# Patient Record
Sex: MALE | Race: WHITE | Employment: FULL TIME | URBAN - METROPOLITAN AREA
[De-identification: names, ages, dates, MRNs, and addresses within clinical notes are randomized per-mention and may not be internally consistent; named-entity substitution may affect disease eponyms.]

---

## 2022-02-25 ENCOUNTER — HOSPITAL ENCOUNTER (OUTPATIENT)
Dept: PREADMISSION TESTING | Age: 67
Discharge: HOME | End: 2022-02-25

## 2022-02-25 ENCOUNTER — HOSPITAL ENCOUNTER (OUTPATIENT)
Dept: GENERAL RADIOLOGY | Age: 67
Discharge: HOME | End: 2022-02-25
Attending: UROLOGY
Payer: COMMERCIAL

## 2022-02-25 VITALS
SYSTOLIC BLOOD PRESSURE: 112 MMHG | DIASTOLIC BLOOD PRESSURE: 73 MMHG | TEMPERATURE: 97.9 F | HEIGHT: 73 IN | BODY MASS INDEX: 33.57 KG/M2 | RESPIRATION RATE: 16 BRPM | HEART RATE: 59 BPM | WEIGHT: 253.31 LBS

## 2022-02-25 LAB
APPEARANCE UR: CLEAR
BACTERIA URNS QL MICRO: NEGATIVE /HPF
BASOPHILS # BLD: 0.1 K/UL (ref 0–0.1)
BASOPHILS NFR BLD: 1 % (ref 0–1)
BILIRUB UR QL: NEGATIVE
COLOR UR: ABNORMAL
DIFFERENTIAL METHOD BLD: ABNORMAL
EOSINOPHIL # BLD: 0.6 K/UL (ref 0–0.4)
EOSINOPHIL NFR BLD: 6 % (ref 0–7)
EPITH CASTS URNS QL MICRO: ABNORMAL /LPF
ERYTHROCYTE [DISTWIDTH] IN BLOOD BY AUTOMATED COUNT: 15.1 % (ref 11.5–14.5)
GLUCOSE UR STRIP.AUTO-MCNC: >1000 MG/DL
HCT VFR BLD AUTO: 46.3 % (ref 36.6–50.3)
HGB BLD-MCNC: 15.1 G/DL (ref 12.1–17)
HGB UR QL STRIP: NEGATIVE
HYALINE CASTS URNS QL MICRO: ABNORMAL /LPF (ref 0–5)
IMM GRANULOCYTES # BLD AUTO: 0.1 K/UL (ref 0–0.04)
IMM GRANULOCYTES NFR BLD AUTO: 1 % (ref 0–0.5)
KETONES UR QL STRIP.AUTO: NEGATIVE MG/DL
LEUKOCYTE ESTERASE UR QL STRIP.AUTO: NEGATIVE
LYMPHOCYTES # BLD: 2.1 K/UL (ref 0.8–3.5)
LYMPHOCYTES NFR BLD: 23 % (ref 12–49)
MCH RBC QN AUTO: 28.8 PG (ref 26–34)
MCHC RBC AUTO-ENTMCNC: 32.6 G/DL (ref 30–36.5)
MCV RBC AUTO: 88.2 FL (ref 80–99)
MONOCYTES # BLD: 0.9 K/UL (ref 0–1)
MONOCYTES NFR BLD: 9 % (ref 5–13)
NEUTS SEG # BLD: 5.6 K/UL (ref 1.8–8)
NEUTS SEG NFR BLD: 60 % (ref 32–75)
NITRITE UR QL STRIP.AUTO: NEGATIVE
NRBC # BLD: 0 K/UL (ref 0–0.01)
NRBC BLD-RTO: 0 PER 100 WBC
PH UR STRIP: 5.5 [PH] (ref 5–8)
PLATELET # BLD AUTO: 237 K/UL (ref 150–400)
PMV BLD AUTO: 10.4 FL (ref 8.9–12.9)
PROT UR STRIP-MCNC: NEGATIVE MG/DL
RBC # BLD AUTO: 5.25 M/UL (ref 4.1–5.7)
RBC #/AREA URNS HPF: ABNORMAL /HPF (ref 0–5)
SP GR UR REFRACTOMETRY: 1.03 (ref 1–1.03)
UA: UC IF INDICATED,UAUC: ABNORMAL
UROBILINOGEN UR QL STRIP.AUTO: 0.2 EU/DL (ref 0.2–1)
WBC # BLD AUTO: 9.3 K/UL (ref 4.1–11.1)
WBC URNS QL MICRO: ABNORMAL /HPF (ref 0–4)

## 2022-02-25 PROCEDURE — 85025 COMPLETE CBC W/AUTO DIFF WBC: CPT

## 2022-02-25 PROCEDURE — 71046 X-RAY EXAM CHEST 2 VIEWS: CPT

## 2022-02-25 PROCEDURE — 81001 URINALYSIS AUTO W/SCOPE: CPT

## 2022-02-25 RX ORDER — METFORMIN HYDROCHLORIDE 750 MG/1
750 TABLET, EXTENDED RELEASE ORAL 2 TIMES DAILY
COMMUNITY

## 2022-02-25 RX ORDER — ASCORBIC ACID 500 MG
1000 TABLET ORAL DAILY
COMMUNITY

## 2022-02-25 RX ORDER — PYRIDOXINE HCL (VITAMIN B6) 100 MG
100 TABLET ORAL
Status: ON HOLD | COMMUNITY
End: 2022-09-09

## 2022-02-25 RX ORDER — SERTRALINE HYDROCHLORIDE 50 MG/1
50 TABLET, FILM COATED ORAL DAILY
COMMUNITY
End: 2022-03-15

## 2022-02-25 RX ORDER — LANOLIN ALCOHOL/MO/W.PET/CERES
2000 CREAM (GRAM) TOPICAL
Status: ON HOLD | COMMUNITY
End: 2022-09-09

## 2022-02-25 RX ORDER — BISMUTH SUBSALICYLATE 262 MG
1 TABLET,CHEWABLE ORAL DAILY
COMMUNITY

## 2022-02-25 RX ORDER — DULAGLUTIDE 0.75 MG/.5ML
0.75 INJECTION, SOLUTION SUBCUTANEOUS
COMMUNITY

## 2022-02-25 RX ORDER — TAMSULOSIN HYDROCHLORIDE 0.4 MG/1
0.8 CAPSULE ORAL DAILY
Status: ON HOLD | COMMUNITY
End: 2022-03-11

## 2022-02-25 RX ORDER — SIMVASTATIN 40 MG/1
40 TABLET, FILM COATED ORAL
COMMUNITY

## 2022-02-25 RX ORDER — TEMAZEPAM 15 MG/1
15 CAPSULE ORAL
COMMUNITY

## 2022-02-25 RX ORDER — TRAZODONE HYDROCHLORIDE 50 MG/1
50 TABLET ORAL 2 TIMES DAILY
COMMUNITY

## 2022-02-25 RX ORDER — SOTALOL HYDROCHLORIDE 120 MG/1
120 TABLET ORAL 2 TIMES DAILY
Status: ON HOLD | COMMUNITY
End: 2022-09-09

## 2022-02-25 RX ORDER — ALLOPURINOL 300 MG/1
300 TABLET ORAL
COMMUNITY

## 2022-02-25 NOTE — PERIOP NOTES
[facility] INSTRUCTIONS    Surgery Date:   3/11/22    [facility] staff will call you between 4 PM- 8 PM the day before surgery with your arrival time. If your surgery is on a Monday, we will call you the preceding Friday. Please call [phone] after 8 PM if you did not receive your arrival time. 1. Please report to [facility] Patient Access/Admitting on the 1st floor. Bring your insurance card, photo identification, and any copayment ( if applicable). 2. If you are going home the same day of your surgery, you must have a responsible adult to drive you home. You need to have a responsible adult to stay with you the first 24 hours after surgery and you should not drive a car for 24 hours following your surgery. 3. Nothing to eat or drink after midnight the night before surgery. This includes no water, gum, mints, coffee, juice, etc.  Please note special instructions, if applicable, below for medications. 4. Do NOT drink alcohol or smoke 24 hours before surgery. STOP smoking for 14 days prior as it helps with breathing and healing after surgery. 5. If you are being admitted to the hospital, please leave personal belongings/luggage in your car until you have an assigned hospital room number. 6. Please wear comfortable clothes. Wear your glasses instead of contacts. We ask that all money, jewelry and valuables be left at home. Wear no make up, particularly mascara, the day of surgery. 7.  All body piercings, rings, and jewelry need to be removed and left at home. Please remove any nail polish or artificial nails from your fingernails. Please wear your hair loose or down. Please no pony-tails, buns, or any metal hair accessories. If you shower the morning of surgery, please do not apply any lotions or powders afterwards. You may wear deodorant, unless having breast surgery. Do not shave any body area within 24 hours of your surgery.   8. Please follow all instructions to avoid any potential surgical cancellation. 9. Should your physical condition change, (i.e. fever, cold, flu, etc.) please notify your surgeon as soon as possible. 10. It is important to be on time. If a situation occurs where you may be delayed, please call:  [phone] / [phone] on the day of surgery. 11. The Preadmission Testing staff can be reached at [phone]. 12. Special instructions: BRING CPAP AND ADVANCED DIRECTIVE      Current Outpatient Medications   Medication Sig    dulaglutide (Trulicity) 7.89 AJ/8.1 mL sub-q pen 0.75 mg by SubCUTAneous route every seven (7) days. PT TAKES ON SUNDAY    metFORMIN ER (GLUCOPHAGE XR) 750 mg tablet Take 750 mg by mouth two (2) times a day.  sotaloL (BETAPACE) 120 mg tablet Take 120 mg by mouth two (2) times a day.  tamsulosin (FLOMAX) 0.4 mg capsule Take 0.8 mg by mouth daily.  allopurinoL (ZYLOPRIM) 300 mg tablet Take 300 mg by mouth nightly.  simvastatin (ZOCOR) 40 mg tablet Take 40 mg by mouth nightly.  apixaban (Eliquis) 5 mg tablet Take 5 mg by mouth two (2) times a day.  sertraline (ZOLOFT) 50 mg tablet Take 50 mg by mouth daily.  empagliflozin (Jardiance) 25 mg tablet Take 25 mg by mouth Every morning.  traZODone (DESYREL) 100 mg tablet Take 200 mg by mouth nightly.  temazepam (RESTORIL) 15 mg capsule Take 15 mg by mouth nightly.  ascorbic acid, vitamin C, (Vitamin C) 500 mg tablet Take 1,000 mg by mouth daily.  multivitamin (ONE A DAY) tablet Take 1 Tablet by mouth daily.  pyridoxine, vitamin B6, (Vitamin B-6) 100 mg tablet Take 100 mg by mouth. Monday AND FRIDAY    cyanocobalamin (Vitamin B-12) 1,000 mcg tablet Take 2,000 mcg by mouth. Monday AND FRIDAY     No current facility-administered medications for this encounter. 1. YOU MUST ONLY TAKE THESE MEDICATIONS THE MORNING OF SURGERY WITH A SIP OF WATER: SOTALOL, SERTRALINE  2. MEDICATIONS TO TAKE THE MORNING OF SURGERY ONLY IF NEEDED: N/A  3.  HOLD these prescription medications BEFORE Surgery: METFORMIN 24 HRS PRIOR TO SURGERY  4. Ask your surgeon/prescribing physician about when/if to STOP taking these medications: ELIQUIS  5. Stop all vitamins, herbal medicines and Aspirin containing products 7 days prior to surgery. Stop any non-steroidal anti-inflammatory drugs (i.e. Ibuprofen, Naproxen, Advil, Aleve) 3 days before surgery. You may take Tylenol. 6. If you are currently taking Plavix, Coumadin, or any other blood-thinning/anticoagulant medication contact your prescribing physician for instructions. Preventing Infections Before and After - Your Surgery    IMPORTANT INSTRUCTIONS      You play an important role in your health and preparation for surgery. To reduce the germs on your skin you will need to shower with CHG soap (Chorhexidine gluconate 4%) two times before surgery. CHG soap (Hibiclens, Hex-A-Clens or store brand)   CHG soap will be provided at your Preadmission Testing (PAT) appointment.  If you do not have a PAT appointment before surgery, you may arrange to  CHG soap from our office or purchase CHG soap at a pharmacy, grocery or department store.  You need to purchase TWO 4 ounce bottles to use for your 2 showers. Steps to follow:  1. Wash your hair with your normal shampoo and your body with regular soap and rinse well to remove shampoo and soap from your skin. 2. Wet a clean washcloth and turn off the shower. 3. Put CHG soap on washcloth and apply to your entire body from the neck down. Do not use on your head, face or private parts(genitals). Do not use CHG soap on open sores, wounds or areas of skin irritation. 4. Wash you body gently for 5 minutes. Do not wash your skin too hard. This soap does not create lather. Pay special attention to your underarms and from your belly button to your feet. 5. Turn the shower back on and rinse well to get CHG soap off your body. 6. Pat your skin dry with a clean, dry towel.  Do not apply lotions or moisturizer. 7. Put on clean clothes and sleep on fresh bed sheets and do not allow pets to sleep with you. Shower with CHG soap 2 times before your surgery   The evening before your surgery   The morning of your surgery      Tips to help prevent infections after your surgery:  1. Protect your surgical wound from germs:  ? Hand washing is the most important thing you and your caregivers can do to prevent infections. ? Keep your bandage clean and dry! ? Do not touch your surgical wound. 2. Use clean, freshly washed towels and washcloths every time you shower; do not share bath linens with others. 3. Until your surgical wound is healed, wear clothing and sleep on bed linens each day that are clean and freshly washed. 4. Do not allow pets to sleep in your bed with you or touch your surgical wound. 5. Do not smoke - smoking delays wound healing. This may be a good time to stop smoking. 6. If you have diabetes, it is important for you to manage your blood sugar levels properly before your surgery as well as after your surgery. Poorly managed blood sugar levels slow down wound healing and prevent you from healing completely. [facility]   Instructions for Pre-Surgery COVID-19 Testing 3/8/22 AT 10AM    Across our ministry we have established standard guidelines to ensure the health and safety of our patients, residents and associates as we resume elective services for patients. All patients presenting for surgery are required to have a COVID-19 test result within 96 hours of their scheduled surgery. [facility] is providing this test free of charge to the patient.    Instructions for COVID-19 Testing:     Patients will receive a call from Pre-Admission Testing 4-5 days prior to surgery to schedule a date and time to come to the [address] [place] [address] Drive for their COVID-19 test   Patients are advised to self-quarantine after testing until their scheduled surgery   Once on site, patients will be registered and receive COVID test in their vehicle   If a patient is scheduled for normal Pre Admission Testing 96 hours from date of surgery, the patient will still have their COVID test done at the [address] [place] [address] located at [address] [place] [address] Positive results will be shared with the surgeon and anesthesiologist and may result in cancellation of the elective procedure    Testing Hours and Location:   Address:  [address] [place] [address] Rd Admission [facility] in the Discharge Lot on [facility] (Map Attached)  [facility], [address]   Hours: Monday- Friday 7a-3p    PAT Phone Number: [phone]            Patient Information Regarding COVID Restrictions    Patients are advised to self-quarantine after COVID testing up to the day of the scheduled procedure. Day of Procedure     Please park in the parking deck or any designated visitor parking lot.  Enter the facility through the Main Entrance of the hospital.   A temperature check and appropriate symptom/exposure screening will be done prior to entry to the facility.  On the day of surgery, please provide the cell phone number of the person who will be waiting for you to the Patient Access representative at the time of registration.  Please wear a mask on the day of your procedure.  We are now allowing one designated visitor per stay. Pediatric patients may have 2 designated visitors. This one person may come in with you on the day of your procedure.  No visitors under the age of 13.  The designated visitor must also wear a mask.  Once your procedure and the immediate recovery period is completed, a nurse in the recovery area will contact your designated visitor to inform them of your room number or to otherwise review other pertinent information regarding your care.      Social distancing practices are to be adhered to in waiting areas and the cafeteria. The patient was contacted  in person. He verbalized understanding of all instructions does not  need reinforcement.

## 2022-02-25 NOTE — PERIOP NOTES
Copy of COVID Vaccine Card place on chart. CARDIAC NOTES, EKG AND CARDIAC CLEARANCE ON CHART. PULMONARY NOTES ON CHART.     CMP and HGBA1C received from PCP and placed on chart

## 2022-03-08 ENCOUNTER — HOSPITAL ENCOUNTER (OUTPATIENT)
Dept: PREADMISSION TESTING | Age: 67
Discharge: HOME | End: 2022-03-08
Attending: UROLOGY
Payer: COMMERCIAL

## 2022-03-08 ENCOUNTER — TRANSCRIBE ORDER (OUTPATIENT)
Dept: REGISTRATION | Age: 67
End: 2022-03-08

## 2022-03-08 DIAGNOSIS — U07.1 COVID-19: Primary | ICD-10-CM

## 2022-03-08 DIAGNOSIS — U07.1 COVID-19: ICD-10-CM

## 2022-03-08 PROCEDURE — U0005 INFEC AGEN DETEC AMPLI PROBE: HCPCS

## 2022-03-09 LAB
SARS-COV-2, XPLCVT: NOT DETECTED
SOURCE, COVRS: NORMAL

## 2022-03-09 NOTE — H&P
Madhavi Morfin is a 77year old male who presents today for \"BPH f/u\". He returns for follow-up. Chronic issues for LUTS of frequency, urgency, weak stream and incomplete bladder emptying secondary to BPH with obstruction and diabetes. He underwent evaluation for UroLift and potentially TURP. Prostate volume was 90 cm³. Images show an obstructing median lobe. Cystoscopy in June 2021 showed lateral lobe obstruction to the prostate with the lateral lobes coapting in the midline and a large median lobe not intravesical without a sulcus. The anatomy was not really conducive to UroLift and we discussed TURP. He elected for trial of tamsulosin and finasteride. He takes Eliquis because of his atrial fibrillation. On 1/10/2022 he had a follow-up transit abdominal ultrasound and lab work. PSA was 0.705 testosterone normal at 329.6. Prostate volume has decreased to approximately 50 cm³. He is still not emptying to completion with prevoid bladder volume of 298 mL and postvoid bladder volume of 254 mL. Ultrasound personally reviewed and does show persistence of obstructing median lobe. Wife comes with him to the visit today. He stated he has not gotten any significant improvement on combination tamsulosin and finasteride. With his wife not in the room he stated his ED has gotten much worse. He is unsure of the dose of Viagra that he takes but he is now taking a full tablet. He says he used to take 1/2 tablet. He says sometimes he is able to obtain an erection suitable for penetration other times not able to do so. Reviewed his ultrasound and postvoid residual results today. He says he just feels like he is not emptying at all. He would like to go forward with cystoscopy and TURP. Reviewed the procedure with him as well as potential risks and complications. He does realize he is at a higher tisk of complications because of his anticoagulation and afib.     PAST MEDICAL HISTORY:    Allergies: PENICILLIN (PENICILLIN V POTASSIUM) (Critical)  ERYTHROMYCIN (ERYTHROMYCIN BASE) (Critical)  DENIES: Latex, Shellfish, X-Ray Dye, Iodine. Medications: temazepam 15 mg capsule (temazepam)   trazodone 50 mg tablet (trazodone)   allopurinol 300 mg tablet (allopurinol)   tamsulosin 0.4 mg capsule (tamsulosin)   Eliquis 5 mg tablet (apixaban)   sertraline 100 mg tablet (sertraline)   Jardiance 25 mg tablet (empagliflozin)   Trulicity 0.82 WN/7.3 mL pen injector (dulaglutide)   simvastatin 40 mg tablet (simvastatin)   sotalol 80 mg tablet (sotalol)   finasteride 5 mg tablet (finasteride) TAKE 1 TABLET BY MOUTH  DAILY    Problems: Anticoagulation therapy (ICD-V58.61) (RSM25-Q40.01)  Chronic atrial fibrillation, unspecified (YNN63-B86.20)  Diabetes mellitus 250.00 (ICD-250.00) (ASY06-W50.9)  Urinary frequency (ICD-788.41) (FRZ30-X58.0)  Incomplete bladder emptying (ICD-788.20) (GRY96-K37.9)  BPH with urinary obstruction (ICD-600.01) (JTM82-X38.1)  Urinary obstruction, unspecified (ICD-599.60) (AMK00-X20.9)    Illnesses: Heart Disease, Diabetes, and High Blood Pressure. DENIES: Pacemaker/Defibrillator, Lung Disease, Bowel Problems, Stroke/Seizure, Kidney Problems, Bleeding Problems, HIV, Hepatitis, or Cancer. Surgeries: Joint Replacement Surgery and Hernia Repair. Family History: Kidney Disease. DENIES: Prostate cancer, Kidney cancer, Kidney stones. Social History: Retired. . Smoking status: never smoker. Does not drink alcohol. System Review: Admits to: Impaired Sex Drive and Involuntary Urine Loss. DENIES: Unexplained Weight Loss, Dry Eyes, Leg Swelling, Shortness of Breath, Constipation, Lower Extremity Weakness, Dry Skin, Difficulty Walking, Psychiatric Problems, Easy Bleeding.      EXAMINATION: Vitals: Pulse: 65 BP: 119/68 Weight: 253 lbs Height: 6' 2\" BMI: 32.60 kg/m^2  Appearance: well-developed NAD Neck: supple Respiratory Effort: breathing easily  Hemoccult: not indicated Skin Inspection: warm and dry Orientation: oriented to person; time and place Mood/Affect: normal       URINALYSIS  Urine Micro not done    PSA HISTORY  0.705 ng/ml on 01/10/2022  0.5 ng/ml on 10/07/2021  1.764 ng/ml on 07/07/2020    IMPRESSION:    1. CHRONIC ATRIAL FIBRILLATION - UNSPECIFIED - Unchanged: Will need cardiac clearance prior to scheduling for cystoscopy and TURP. 2. BPH WITH URINARY OBSTRUCTION (QIJ80-Y61.1) - Improved: Cystoscopy and TURP. Discussed potential risks and complications. He does fall into the high risk category because of his anticoagulation. He would like to go forward with TURP. See about scheduling. 3. INCOMPLETE BLADDER EMPTYING (FCX77-Z81.9) - Unchanged: Significant residuals still in the bladder 250 mL. Prostate has decreased in size secondary to the finasteride however he still has obstructing median lobe which I think will need to be addressed surgically. 4. ANTICOAGULATION THERAPY (LPA72-H97.01) - Unchanged: Anticoagulation therapy puts him in a higher risk category for bleeding etc.  Will need cardiac clearance prior to scheduling.      NO CHANGE IN PATIENT EXAM OR STATUS SINCE 3/09/22

## 2022-03-10 ENCOUNTER — ANESTHESIA EVENT (OUTPATIENT)
Dept: SURGERY | Age: 67
End: 2022-03-10
Payer: COMMERCIAL

## 2022-03-11 ENCOUNTER — ANESTHESIA (OUTPATIENT)
Dept: SURGERY | Age: 67
End: 2022-03-11
Payer: COMMERCIAL

## 2022-03-11 ENCOUNTER — HOSPITAL ENCOUNTER (OUTPATIENT)
Age: 67
Discharge: HOME | End: 2022-03-12
Attending: UROLOGY | Admitting: UROLOGY
Payer: COMMERCIAL

## 2022-03-11 LAB
GLUCOSE BLD STRIP.AUTO-MCNC: 107 MG/DL (ref 65–117)
GLUCOSE BLD STRIP.AUTO-MCNC: 114 MG/DL (ref 65–117)
GLUCOSE BLD STRIP.AUTO-MCNC: 135 MG/DL (ref 65–117)
GLUCOSE BLD STRIP.AUTO-MCNC: 91 MG/DL (ref 65–117)
SERVICE CMNT-IMP: ABNORMAL
SERVICE CMNT-IMP: NORMAL

## 2022-03-11 PROCEDURE — 77030021707 HC SET IRR FLD WRMR 3M -B: Performed by: UROLOGY

## 2022-03-11 PROCEDURE — 74011000250 HC RX REV CODE- 250: Performed by: UROLOGY

## 2022-03-11 PROCEDURE — 74011250637 HC RX REV CODE- 250/637: Performed by: ANESTHESIOLOGY

## 2022-03-11 PROCEDURE — 88305 TISSUE EXAM BY PATHOLOGIST: CPT

## 2022-03-11 PROCEDURE — 74011250637 HC RX REV CODE- 250/637: Performed by: UROLOGY

## 2022-03-11 PROCEDURE — 74011250636 HC RX REV CODE- 250/636: Performed by: NURSE ANESTHETIST, CERTIFIED REGISTERED

## 2022-03-11 PROCEDURE — 76060000034 HC ANESTHESIA 1.5 TO 2 HR: Performed by: UROLOGY

## 2022-03-11 PROCEDURE — 76210000006 HC OR PH I REC 0.5 TO 1 HR: Performed by: UROLOGY

## 2022-03-11 PROCEDURE — 74011250636 HC RX REV CODE- 250/636: Performed by: ANESTHESIOLOGY

## 2022-03-11 PROCEDURE — 77030008684 HC TU ET CUF COVD -B: Performed by: STUDENT IN AN ORGANIZED HEALTH CARE EDUCATION/TRAINING PROGRAM

## 2022-03-11 PROCEDURE — 74011000250 HC RX REV CODE- 250: Performed by: NURSE ANESTHETIST, CERTIFIED REGISTERED

## 2022-03-11 PROCEDURE — 82962 GLUCOSE BLOOD TEST: CPT

## 2022-03-11 PROCEDURE — 77030040831 HC BAG URINE DRNG MDII -A: Performed by: UROLOGY

## 2022-03-11 PROCEDURE — 77030019908 HC STETH ESOPH SIMS -A: Performed by: STUDENT IN AN ORGANIZED HEALTH CARE EDUCATION/TRAINING PROGRAM

## 2022-03-11 PROCEDURE — 76010000153 HC OR TIME 1.5 TO 2 HR: Performed by: UROLOGY

## 2022-03-11 PROCEDURE — 2709999900 HC NON-CHARGEABLE SUPPLY

## 2022-03-11 PROCEDURE — 77030013079 HC BLNKT BAIR HGGR 3M -A: Performed by: STUDENT IN AN ORGANIZED HEALTH CARE EDUCATION/TRAINING PROGRAM

## 2022-03-11 PROCEDURE — 77030026438 HC STYL ET INTUB CARD -A: Performed by: STUDENT IN AN ORGANIZED HEALTH CARE EDUCATION/TRAINING PROGRAM

## 2022-03-11 PROCEDURE — 2709999900 HC NON-CHARGEABLE SUPPLY: Performed by: UROLOGY

## 2022-03-11 PROCEDURE — 74011250636 HC RX REV CODE- 250/636: Performed by: UROLOGY

## 2022-03-11 RX ORDER — SODIUM CHLORIDE, SODIUM LACTATE, POTASSIUM CHLORIDE, CALCIUM CHLORIDE 600; 310; 30; 20 MG/100ML; MG/100ML; MG/100ML; MG/100ML
1000 INJECTION, SOLUTION INTRAVENOUS CONTINUOUS
Status: DISCONTINUED | OUTPATIENT
Start: 2022-03-11 | End: 2022-03-11 | Stop reason: HOSPADM

## 2022-03-11 RX ORDER — SODIUM CHLORIDE 9 MG/ML
25 INJECTION, SOLUTION INTRAVENOUS CONTINUOUS
Status: DISCONTINUED | OUTPATIENT
Start: 2022-03-11 | End: 2022-03-11 | Stop reason: HOSPADM

## 2022-03-11 RX ORDER — GLYCOPYRROLATE 0.2 MG/ML
INJECTION INTRAMUSCULAR; INTRAVENOUS AS NEEDED
Status: DISCONTINUED | OUTPATIENT
Start: 2022-03-11 | End: 2022-03-11 | Stop reason: HOSPADM

## 2022-03-11 RX ORDER — PROPOFOL 10 MG/ML
INJECTION, EMULSION INTRAVENOUS AS NEEDED
Status: DISCONTINUED | OUTPATIENT
Start: 2022-03-11 | End: 2022-03-11 | Stop reason: HOSPADM

## 2022-03-11 RX ORDER — MIDAZOLAM HYDROCHLORIDE 1 MG/ML
0.5 INJECTION, SOLUTION INTRAMUSCULAR; INTRAVENOUS
Status: DISCONTINUED | OUTPATIENT
Start: 2022-03-11 | End: 2022-03-11 | Stop reason: HOSPADM

## 2022-03-11 RX ORDER — DIPHENHYDRAMINE HYDROCHLORIDE 50 MG/ML
12.5 INJECTION, SOLUTION INTRAMUSCULAR; INTRAVENOUS AS NEEDED
Status: DISCONTINUED | OUTPATIENT
Start: 2022-03-11 | End: 2022-03-11 | Stop reason: HOSPADM

## 2022-03-11 RX ORDER — ROPIVACAINE HYDROCHLORIDE 5 MG/ML
30 INJECTION, SOLUTION EPIDURAL; INFILTRATION; PERINEURAL AS NEEDED
Status: DISCONTINUED | OUTPATIENT
Start: 2022-03-11 | End: 2022-03-11 | Stop reason: HOSPADM

## 2022-03-11 RX ORDER — SERTRALINE HYDROCHLORIDE 50 MG/1
50 TABLET, FILM COATED ORAL DAILY
Status: DISCONTINUED | OUTPATIENT
Start: 2022-03-12 | End: 2022-03-12 | Stop reason: HOSPADM

## 2022-03-11 RX ORDER — HYDROMORPHONE HYDROCHLORIDE 1 MG/ML
1 INJECTION, SOLUTION INTRAMUSCULAR; INTRAVENOUS; SUBCUTANEOUS
Status: DISCONTINUED | OUTPATIENT
Start: 2022-03-11 | End: 2022-03-12 | Stop reason: HOSPADM

## 2022-03-11 RX ORDER — ONDANSETRON 2 MG/ML
4 INJECTION INTRAMUSCULAR; INTRAVENOUS
Status: DISCONTINUED | OUTPATIENT
Start: 2022-03-11 | End: 2022-03-12 | Stop reason: HOSPADM

## 2022-03-11 RX ORDER — ACETAMINOPHEN 325 MG/1
650 TABLET ORAL
Status: DISCONTINUED | OUTPATIENT
Start: 2022-03-11 | End: 2022-03-12 | Stop reason: HOSPADM

## 2022-03-11 RX ORDER — ONDANSETRON 2 MG/ML
INJECTION INTRAMUSCULAR; INTRAVENOUS AS NEEDED
Status: DISCONTINUED | OUTPATIENT
Start: 2022-03-11 | End: 2022-03-11 | Stop reason: HOSPADM

## 2022-03-11 RX ORDER — HYDROMORPHONE HYDROCHLORIDE 1 MG/ML
0.2 INJECTION, SOLUTION INTRAMUSCULAR; INTRAVENOUS; SUBCUTANEOUS
Status: DISCONTINUED | OUTPATIENT
Start: 2022-03-11 | End: 2022-03-11 | Stop reason: HOSPADM

## 2022-03-11 RX ORDER — INSULIN LISPRO 100 [IU]/ML
INJECTION, SOLUTION INTRAVENOUS; SUBCUTANEOUS
Status: DISCONTINUED | OUTPATIENT
Start: 2022-03-11 | End: 2022-03-12 | Stop reason: HOSPADM

## 2022-03-11 RX ORDER — SODIUM CHLORIDE 0.9 % (FLUSH) 0.9 %
5-40 SYRINGE (ML) INJECTION AS NEEDED
Status: DISCONTINUED | OUTPATIENT
Start: 2022-03-11 | End: 2022-03-12 | Stop reason: HOSPADM

## 2022-03-11 RX ORDER — LIDOCAINE HYDROCHLORIDE 10 MG/ML
0.1 INJECTION, SOLUTION EPIDURAL; INFILTRATION; INTRACAUDAL; PERINEURAL AS NEEDED
Status: DISCONTINUED | OUTPATIENT
Start: 2022-03-11 | End: 2022-03-11 | Stop reason: HOSPADM

## 2022-03-11 RX ORDER — SUCCINYLCHOLINE CHLORIDE 20 MG/ML
INJECTION INTRAMUSCULAR; INTRAVENOUS AS NEEDED
Status: DISCONTINUED | OUTPATIENT
Start: 2022-03-11 | End: 2022-03-11 | Stop reason: HOSPADM

## 2022-03-11 RX ORDER — NEOSTIGMINE METHYLSULFATE 1 MG/ML
INJECTION, SOLUTION INTRAVENOUS AS NEEDED
Status: DISCONTINUED | OUTPATIENT
Start: 2022-03-11 | End: 2022-03-11 | Stop reason: HOSPADM

## 2022-03-11 RX ORDER — SODIUM CHLORIDE 0.9 % (FLUSH) 0.9 %
5-40 SYRINGE (ML) INJECTION EVERY 8 HOURS
Status: DISCONTINUED | OUTPATIENT
Start: 2022-03-11 | End: 2022-03-12 | Stop reason: HOSPADM

## 2022-03-11 RX ORDER — ATORVASTATIN CALCIUM 10 MG/1
20 TABLET, FILM COATED ORAL DAILY
Status: DISCONTINUED | OUTPATIENT
Start: 2022-03-12 | End: 2022-03-12 | Stop reason: HOSPADM

## 2022-03-11 RX ORDER — MIDAZOLAM HYDROCHLORIDE 1 MG/ML
1 INJECTION, SOLUTION INTRAMUSCULAR; INTRAVENOUS AS NEEDED
Status: DISCONTINUED | OUTPATIENT
Start: 2022-03-11 | End: 2022-03-11 | Stop reason: HOSPADM

## 2022-03-11 RX ORDER — TRAZODONE HYDROCHLORIDE 50 MG/1
200 TABLET ORAL
Status: DISCONTINUED | OUTPATIENT
Start: 2022-03-11 | End: 2022-03-12 | Stop reason: HOSPADM

## 2022-03-11 RX ORDER — MIDAZOLAM HYDROCHLORIDE 1 MG/ML
INJECTION, SOLUTION INTRAMUSCULAR; INTRAVENOUS AS NEEDED
Status: DISCONTINUED | OUTPATIENT
Start: 2022-03-11 | End: 2022-03-11 | Stop reason: HOSPADM

## 2022-03-11 RX ORDER — ONDANSETRON 2 MG/ML
4 INJECTION INTRAMUSCULAR; INTRAVENOUS AS NEEDED
Status: DISCONTINUED | OUTPATIENT
Start: 2022-03-11 | End: 2022-03-11 | Stop reason: HOSPADM

## 2022-03-11 RX ORDER — DEXAMETHASONE SODIUM PHOSPHATE 4 MG/ML
INJECTION, SOLUTION INTRA-ARTICULAR; INTRALESIONAL; INTRAMUSCULAR; INTRAVENOUS; SOFT TISSUE AS NEEDED
Status: DISCONTINUED | OUTPATIENT
Start: 2022-03-11 | End: 2022-03-11 | Stop reason: HOSPADM

## 2022-03-11 RX ORDER — ATROPA BELLADONNA AND OPIUM 16.2; 6 MG/1; MG/1
1 SUPPOSITORY RECTAL
Status: DISCONTINUED | OUTPATIENT
Start: 2022-03-11 | End: 2022-03-12 | Stop reason: HOSPADM

## 2022-03-11 RX ORDER — HYDROCODONE BITARTRATE AND ACETAMINOPHEN 5; 325 MG/1; MG/1
1 TABLET ORAL AS NEEDED
Status: DISCONTINUED | OUTPATIENT
Start: 2022-03-11 | End: 2022-03-11 | Stop reason: HOSPADM

## 2022-03-11 RX ORDER — FENTANYL CITRATE 50 UG/ML
INJECTION, SOLUTION INTRAMUSCULAR; INTRAVENOUS AS NEEDED
Status: DISCONTINUED | OUTPATIENT
Start: 2022-03-11 | End: 2022-03-11 | Stop reason: HOSPADM

## 2022-03-11 RX ORDER — HYDROCODONE BITARTRATE AND ACETAMINOPHEN 5; 325 MG/1; MG/1
1 TABLET ORAL
Status: DISCONTINUED | OUTPATIENT
Start: 2022-03-11 | End: 2022-03-12 | Stop reason: HOSPADM

## 2022-03-11 RX ORDER — GLYCOPYRROLATE 0.2 MG/ML
0.2 INJECTION INTRAMUSCULAR; INTRAVENOUS
Status: DISCONTINUED | OUTPATIENT
Start: 2022-03-11 | End: 2022-03-11 | Stop reason: HOSPADM

## 2022-03-11 RX ORDER — MAGNESIUM SULFATE 100 %
4 CRYSTALS MISCELLANEOUS AS NEEDED
Status: DISCONTINUED | OUTPATIENT
Start: 2022-03-11 | End: 2022-03-12 | Stop reason: HOSPADM

## 2022-03-11 RX ORDER — ALBUTEROL SULFATE 0.83 MG/ML
2.5 SOLUTION RESPIRATORY (INHALATION) AS NEEDED
Status: DISCONTINUED | OUTPATIENT
Start: 2022-03-11 | End: 2022-03-11 | Stop reason: HOSPADM

## 2022-03-11 RX ORDER — MORPHINE SULFATE 2 MG/ML
2 INJECTION, SOLUTION INTRAMUSCULAR; INTRAVENOUS
Status: DISCONTINUED | OUTPATIENT
Start: 2022-03-11 | End: 2022-03-11 | Stop reason: HOSPADM

## 2022-03-11 RX ORDER — SOTALOL HYDROCHLORIDE 80 MG/1
120 TABLET ORAL 2 TIMES DAILY
Status: DISCONTINUED | OUTPATIENT
Start: 2022-03-11 | End: 2022-03-12 | Stop reason: HOSPADM

## 2022-03-11 RX ORDER — METFORMIN HYDROCHLORIDE 750 MG/1
750 TABLET, EXTENDED RELEASE ORAL 2 TIMES DAILY
Status: DISCONTINUED | OUTPATIENT
Start: 2022-03-11 | End: 2022-03-12 | Stop reason: HOSPADM

## 2022-03-11 RX ORDER — EPHEDRINE SULFATE/0.9% NACL/PF 50 MG/5 ML
SYRINGE (ML) INTRAVENOUS AS NEEDED
Status: DISCONTINUED | OUTPATIENT
Start: 2022-03-11 | End: 2022-03-11 | Stop reason: HOSPADM

## 2022-03-11 RX ORDER — LIDOCAINE HYDROCHLORIDE 20 MG/ML
INJECTION, SOLUTION EPIDURAL; INFILTRATION; INTRACAUDAL; PERINEURAL AS NEEDED
Status: DISCONTINUED | OUTPATIENT
Start: 2022-03-11 | End: 2022-03-11 | Stop reason: HOSPADM

## 2022-03-11 RX ORDER — NALOXONE HYDROCHLORIDE 0.4 MG/ML
0.4 INJECTION, SOLUTION INTRAMUSCULAR; INTRAVENOUS; SUBCUTANEOUS AS NEEDED
Status: DISCONTINUED | OUTPATIENT
Start: 2022-03-11 | End: 2022-03-12 | Stop reason: HOSPADM

## 2022-03-11 RX ORDER — MORPHINE SULFATE 4 MG/ML
INJECTION INTRAVENOUS AS NEEDED
Status: DISCONTINUED | OUTPATIENT
Start: 2022-03-11 | End: 2022-03-11 | Stop reason: HOSPADM

## 2022-03-11 RX ORDER — TEMAZEPAM 15 MG/1
15 CAPSULE ORAL
Status: DISCONTINUED | OUTPATIENT
Start: 2022-03-11 | End: 2022-03-12 | Stop reason: HOSPADM

## 2022-03-11 RX ORDER — ROCURONIUM BROMIDE 10 MG/ML
INJECTION, SOLUTION INTRAVENOUS AS NEEDED
Status: DISCONTINUED | OUTPATIENT
Start: 2022-03-11 | End: 2022-03-11 | Stop reason: HOSPADM

## 2022-03-11 RX ORDER — DIPHENHYDRAMINE HCL 25 MG
25 CAPSULE ORAL
Status: DISCONTINUED | OUTPATIENT
Start: 2022-03-11 | End: 2022-03-12 | Stop reason: HOSPADM

## 2022-03-11 RX ORDER — ACETAMINOPHEN 325 MG/1
650 TABLET ORAL ONCE
Status: COMPLETED | OUTPATIENT
Start: 2022-03-11 | End: 2022-03-11

## 2022-03-11 RX ORDER — FENTANYL CITRATE 50 UG/ML
50 INJECTION, SOLUTION INTRAMUSCULAR; INTRAVENOUS AS NEEDED
Status: DISCONTINUED | OUTPATIENT
Start: 2022-03-11 | End: 2022-03-11 | Stop reason: HOSPADM

## 2022-03-11 RX ORDER — FENTANYL CITRATE 50 UG/ML
25 INJECTION, SOLUTION INTRAMUSCULAR; INTRAVENOUS
Status: DISCONTINUED | OUTPATIENT
Start: 2022-03-11 | End: 2022-03-11 | Stop reason: HOSPADM

## 2022-03-11 RX ADMIN — METFORMIN HYDROCHLORIDE 750 MG: 750 TABLET ORAL at 17:50

## 2022-03-11 RX ADMIN — ONDANSETRON HYDROCHLORIDE 4 MG: 2 INJECTION, SOLUTION INTRAMUSCULAR; INTRAVENOUS at 09:45

## 2022-03-11 RX ADMIN — SUCCINYLCHOLINE CHLORIDE 160 MG: 20 INJECTION, SOLUTION INTRAMUSCULAR; INTRAVENOUS at 08:19

## 2022-03-11 RX ADMIN — FENTANYL CITRATE 100 MCG: 50 INJECTION, SOLUTION INTRAMUSCULAR; INTRAVENOUS at 08:19

## 2022-03-11 RX ADMIN — HYDROMORPHONE HYDROCHLORIDE 0.2 MG: 1 INJECTION, SOLUTION INTRAMUSCULAR; INTRAVENOUS; SUBCUTANEOUS at 10:30

## 2022-03-11 RX ADMIN — SODIUM CHLORIDE, POTASSIUM CHLORIDE, SODIUM LACTATE AND CALCIUM CHLORIDE: 600; 310; 30; 20 INJECTION, SOLUTION INTRAVENOUS at 09:48

## 2022-03-11 RX ADMIN — PROPOFOL 200 MG: 10 INJECTION, EMULSION INTRAVENOUS at 08:19

## 2022-03-11 RX ADMIN — SODIUM CHLORIDE, PRESERVATIVE FREE 10 ML: 5 INJECTION INTRAVENOUS at 17:00

## 2022-03-11 RX ADMIN — SODIUM CHLORIDE, POTASSIUM CHLORIDE, SODIUM LACTATE AND CALCIUM CHLORIDE 1000 ML: 600; 310; 30; 20 INJECTION, SOLUTION INTRAVENOUS at 07:07

## 2022-03-11 RX ADMIN — LIDOCAINE HYDROCHLORIDE 100 MG: 20 INJECTION, SOLUTION EPIDURAL; INFILTRATION; INTRACAUDAL; PERINEURAL at 08:19

## 2022-03-11 RX ADMIN — WATER 2 G: 1 INJECTION INTRAMUSCULAR; INTRAVENOUS; SUBCUTANEOUS at 08:30

## 2022-03-11 RX ADMIN — TEMAZEPAM 15 MG: 15 CAPSULE ORAL at 22:15

## 2022-03-11 RX ADMIN — GLYCOPYRROLATE 0.4 MG: 0.2 INJECTION, SOLUTION INTRAMUSCULAR; INTRAVENOUS at 09:48

## 2022-03-11 RX ADMIN — Medication 10 MG: at 08:24

## 2022-03-11 RX ADMIN — HYDROMORPHONE HYDROCHLORIDE 0.2 MG: 1 INJECTION, SOLUTION INTRAMUSCULAR; INTRAVENOUS; SUBCUTANEOUS at 10:40

## 2022-03-11 RX ADMIN — PHENYLEPHRINE HYDROCHLORIDE 40 MCG/MIN: 10 INJECTION INTRAVENOUS at 08:30

## 2022-03-11 RX ADMIN — TRAZODONE HYDROCHLORIDE 100 MG: 50 TABLET ORAL at 21:05

## 2022-03-11 RX ADMIN — MIDAZOLAM 4 MG: 1 INJECTION INTRAMUSCULAR; INTRAVENOUS at 08:09

## 2022-03-11 RX ADMIN — ROCURONIUM BROMIDE 40 MG: 10 SOLUTION INTRAVENOUS at 08:32

## 2022-03-11 RX ADMIN — MORPHINE SULFATE 4 MG: 4 INJECTION INTRAVENOUS at 08:30

## 2022-03-11 RX ADMIN — ROCURONIUM BROMIDE 10 MG: 10 SOLUTION INTRAVENOUS at 08:19

## 2022-03-11 RX ADMIN — SOTALOL HYDROCHLORIDE 120 MG: 80 TABLET ORAL at 17:49

## 2022-03-11 RX ADMIN — ACETAMINOPHEN 650 MG: 325 TABLET ORAL at 07:08

## 2022-03-11 RX ADMIN — NEOSTIGMINE METHYLSULFATE 3 MG: 1 INJECTION, SOLUTION INTRAVENOUS at 09:48

## 2022-03-11 RX ADMIN — DEXAMETHASONE SODIUM PHOSPHATE 4 MG: 4 INJECTION, SOLUTION INTRAMUSCULAR; INTRAVENOUS at 08:30

## 2022-03-11 RX ADMIN — HYDROMORPHONE HYDROCHLORIDE 0.2 MG: 1 INJECTION, SOLUTION INTRAMUSCULAR; INTRAVENOUS; SUBCUTANEOUS at 10:20

## 2022-03-11 NOTE — PERIOP NOTES
TRANSFER - OUT REPORT:    Verbal report given to Audrey(name) on Avis Jones  being transferred to [facility](unit) for routine post - op       Report consisted of patients Situation, Background, Assessment and   Recommendations(SBAR). Time Pre op antibiotic given:0830  Anesthesia Stop time: 4260    Information from the following report(s) SBAR, OR Summary, Intake/Output, MAR, Recent Results and Cardiac Rhythm SB. was reviewed with the receiving nurse. Opportunity for questions and clarification was provided. Is the patient on 02? NO    Is the patient on a monitor? NO    Is the nurse transporting with the patient? NO    Surgical Waiting Area notified of patient's transfer from PACU? YES      The following personal items collected during your admission accompanied patient upon transfer:   Dental Appliance:    Vision: Visual Aid: None  Hearing Aid:    Jewelry:    Clothing: Clothing:  (clothing to [employer])  Other Valuables: Other Valuables:  Other (comment) (Cpap machine to pacu)  Valuables sent to safe:

## 2022-03-11 NOTE — ANESTHESIA POSTPROCEDURE EVALUATION
Procedure(s):  CYSTOSCOPY WITH BIPLOAR TRANSURETHRAL RESECTION OF PROSTATE.    general    Anesthesia Post Evaluation      Multimodal analgesia: multimodal analgesia used between 6 hours prior to anesthesia start to PACU discharge  Patient location during evaluation: bedside  Patient participation: complete - patient participated  Level of consciousness: awake  Pain score: 0  Pain management: satisfactory to patient  Airway patency: patent  Anesthetic complications: no  Cardiovascular status: acceptable and blood pressure returned to baseline  Respiratory status: acceptable  Hydration status: acceptable  Comments: I have evaluated the patient and meets criteria for discharge from PACU. Edi Hoyos DO. Post anesthesia nausea and vomiting:  none  Final Post Anesthesia Temperature Assessment:  Normothermia (36.0-37.5 degrees C)      INITIAL Post-op Vital signs:   Vitals Value Taken Time   /65 03/11/22 1015   Temp 36.7 °C (98.1 °F) 03/11/22 1007   Pulse 61 03/11/22 1017   Resp 13 03/11/22 1017   SpO2 95 % 03/11/22 1017   Vitals shown include unvalidated device data.

## 2022-03-11 NOTE — ANESTHESIA PREPROCEDURE EVALUATION
Relevant Problems   No relevant active problems       Anesthetic History   No history of anesthetic complications            Review of Systems / Medical History  Patient summary reviewed, nursing notes reviewed and pertinent labs reviewed    Pulmonary        Sleep apnea: CPAP           Neuro/Psych   Within defined limits           Cardiovascular            Dysrhythmias : atrial fibrillation      Exercise tolerance: >4 METS     GI/Hepatic/Renal  Within defined limits              Endo/Other  Within defined limits           Other Findings              Physical Exam    Airway  Mallampati: II  TM Distance: > 6 cm  Neck ROM: normal range of motion   Mouth opening: Normal     Cardiovascular  Regular rate and rhythm,  S1 and S2 normal,  no murmur, click, rub, or gallop             Dental  No notable dental hx       Pulmonary  Breath sounds clear to auscultation               Abdominal  GI exam deferred       Other Findings            Anesthetic Plan    ASA: 3  Anesthesia type: general          Induction: Intravenous  Anesthetic plan and risks discussed with: Patient

## 2022-03-11 NOTE — CONSULTS
Medical consult     Primary Care Provider: Jamaal Jaimes MD  Source of Information: Patient     History of Presenting Illness:   Christopher Mistry is a 77 y.o. male who presents with DM management     Elsa Cabrera is a 77year old with a history of diabetes BPH depression A. Fib. He is here for TURP and the cystoscope. Was seen in OR this morning, currently back to his alone. Medical service is consulted for diabetes and other medical management. Patient feels well at this moment, diet to be started, he is on Eliquis for anticoagulation of his A. fib last dose of Eliquis was 2 days ago. Review of Systems:  General: HPI, no change of the weight, no fever,  HEENT: no headache, no vision changes, no nose discharge, no hearing changes   RES: no wheezing, no cough, no sob  CVS: no cp, no palpitation. Muscular: no joint swelling, no muscle pain, no leg swelling  Skin: no rash, no itching   GI: no vomiting, no diarrhea  : HPI. Hemo: no gum bleeding, no petechial   Neuro: no sensation changes, no focal weakness   Endo: no polydipsia   Psych: denied depression     Past Medical History:   Diagnosis Date    Arrhythmia     A- FLUTTER  A-FIB CARDIOVERSION 8/2021, CARDIAC ABLATION 2018, 2020    COVID-19 vaccine series completed     Depression     Diabetes ([address]     Gout     Hypertension     Insomnia     Sleep apnea     CPAP      Past Surgical History:   Procedure Laterality Date    HX COLONOSCOPY      HX KNEE REPLACEMENT Right 2007    NEUROLOGICAL PROCEDURE UNLISTED  1997    LUMBAR 4-5 DISCECTOMY    HI CARDIAC SURG PROCEDURE UNLIST  2018, 2020    CARDIAC ABLATION    HI CARDIAC SURG PROCEDURE UNLIST  08/2021    CARDIOVERSION X 4 OR 5     Prior to Admission medications    Medication Sig Start Date End Date Taking? Authorizing Provider   sotaloL (BETAPACE) 120 mg tablet Take 120 mg by mouth two (2) times a day.    Yes Provider, Historical   allopurinoL (ZYLOPRIM) 300 mg tablet Take 300 mg by mouth nightly. Yes Provider, Historical   simvastatin (ZOCOR) 40 mg tablet Take 40 mg by mouth nightly. Yes Provider, Historical   sertraline (ZOLOFT) 50 mg tablet Take 50 mg by mouth daily. Yes Provider, Historical   traZODone (DESYREL) 100 mg tablet Take 200 mg by mouth nightly. Yes Provider, Historical   ascorbic acid, vitamin C, (Vitamin C) 500 mg tablet Take 1,000 mg by mouth daily. Yes Provider, Historical   multivitamin (ONE A DAY) tablet Take 1 Tablet by mouth daily. Yes Provider, Historical   pyridoxine, vitamin B6, (Vitamin B-6) 100 mg tablet Take 100 mg by mouth. Monday AND FRIDAY   Yes Provider, Historical   cyanocobalamin (Vitamin B-12) 1,000 mcg tablet Take 2,000 mcg by mouth. Monday AND FRIDAY   Yes Provider, Historical   dulaglutide (Trulicity) 8.20 HE/7.1 mL sub-q pen 0.75 mg by SubCUTAneous route every seven (7) days. PT TAKES ON SUNDAY    Provider, Historical   metFORMIN ER (GLUCOPHAGE XR) 750 mg tablet Take 750 mg by mouth two (2) times a day. Provider, Historical   apixaban (Eliquis) 5 mg tablet Take 5 mg by mouth two (2) times a day. Provider, Historical   empagliflozin (Jardiance) 25 mg tablet Take 25 mg by mouth Every morning. Provider, Historical   temazepam (RESTORIL) 15 mg capsule Take 15 mg by mouth nightly. Provider, Historical     Allergies   Allergen Reactions    Erythromycin Rash    Pcn [Penicillins] Rash     AS CHILD.   PT DOES NOT KNOW SEVERITY        Family History   Problem Relation Age of Onset   [facility] Diabetes Mother     Dementia Mother     Hypertension Mother     Diabetes Father    [facility] Hypertension Father     Diabetes Sister     Diabetes Brother     Kidney Disease Brother     Heart Disease Maternal Grandfather     Diabetes Sister     Diabetes Sister     Anesth Problems Neg Hx         SOCIAL HISTORY:  Patient resides:  Independently c   Assisted Living    SNF    With family care       Smoking history:   None c   Former Chronic      Alcohol history:   None c   Social    Chronic      Ambulates:   Independently c   w/cane    w/walker    w/wc    CODE STATUS:  DNR    Full c   Other      Objective:     Physical Exam:     Visit Vitals  /72   Pulse 61   Temp 97.7 °F (36.5 °C)   Resp 16   Ht 6' 1\" (1.854 m)   Wt 114.9 kg (253 lb 4.9 oz)   SpO2 93%   BMI 33.42 kg/m²    O2 Flow Rate (L/min): 2 l/min O2 Device: None (Room air)    General:  Alert, cooperative, no distress, appears stated age. Head:  Normocephalic, without obvious abnormality, atraumatic. Eyes:  Conjunctivae/corneas clear. PERRL, EOMs intact. Nose: Nares normal. Septum midline. Mucosa normal. No drainage or sinus tenderness. Throat: Lips, mucosa, and tongue normal. Teeth and gums normal.   Neck: Supple, symmetrical, trachea midline, no adenopathy, thyroid: no enlargement/tenderness/nodules, no carotid bruit and no JVD. Back:   Symmetric, no curvature. ROM normal. No CVA tenderness. Lungs:   Clear to auscultation bilaterally. Chest wall:  No tenderness or deformity. Heart:  Regular rate and rhythm, S1, S2 normal, no murmur, click, rub or gallop. Abdomen:   Soft, non-tender. Bowel sounds normal. No masses,  No organomegaly. Extremities: Extremities normal, atraumatic, no cyanosis or edema. Pulses: 2+ and symmetric all extremities. Skin: Skin color, texture, turgor normal. No rashes or lesions   Neurologic: CNII-XII intact. Data Review:     Recent Days:  No results for input(s): WBC, HGB, HCT, PLT, HGBEXT, HCTEXT, PLTEXT in the last 72 hours. No results for input(s): NA, K, CL, CO2, GLU, BUN, CREA, CA, MG, PHOS, ALB, TBIL, ALT, INR, INREXT in the last 72 hours. No lab exists for component: SGOT  No results for input(s): PH, PCO2, PO2, HCO3, FIO2 in the last 72 hours.     24 Hour Results:  Recent Results (from the past 24 hour(s))   GLUCOSE, POC    Collection Time: 03/11/22  7:04 AM   Result Value Ref Range    Glucose (POC) 91 65 - 117 mg/dL    Performed by Deshawn House    GLUCOSE, POC    Collection Time: 03/11/22 10:35 AM   Result Value Ref Range    Glucose (POC) 107 65 - 117 mg/dL    Performed by Douglas Marquez          Imaging:   No results found. Assessment:     Active Problems:    * No active hospital problems. *         Plan:     1. Diabetes: May resume his Metformin, Jardiance in hospital if we have this formula. Will cover with sliding scale. He also takes Trulicity which is once a week and the last dose was on Sunday. 2.  History of A. fib, patient on Betapace will continue his anticoagulations on hold. He will resume Eliquis when cleared by urologist.  3.  Depression stable continue home meds.        Signed By: Liliya Mas MD     March 11, 2022

## 2022-03-11 NOTE — OP NOTES
[facility]  OPERATIVE REPORT    Name:  Javi Casas  MR#:  [id number]  :  1955  ACCOUNT #:  [de-identified]  DATE OF SERVICE:  2022      PREOPERATIVE DIAGNOSES:  1. Benign prostatic hypertrophy obstruction. 2.  Incomplete bladder emptying. POSTOPERATIVE DIAGNOSES:  1. Benign prostatic hypertrophy obstruction. 2.  Incomplete bladder emptying. PROCEDURE PERFORMED:  1. Cystoscopy. 2.  TURP. SURGEON:  Melissa Thomas MD    ASSISTANT:  OR Staff. ANESTHESIA:  General.    COMPLICATIONS:  None. SPECIMENS REMOVED:  Prostate adenoma. ESTIMATED BLOOD LOSS:  Less than 50 mL. DRAINS:  26-Turkish 3-way Zuniga catheter. FINDINGS:  Trilobar enlargement of the prostate. INDICATIONS FOR PROCEDURE:  The patient is a 55-year-old  male who has been seen and evaluated for his lower urinary tract symptomatologies. He failed medical management and was not a candidate for other minimally invasive procedures, so he was scheduled for cystoscopy and TURP. PROCEDURE:  After the patient was identified and informed consent obtained, the potential risks and complications of the procedure reviewed. He was brought back to the operating room suite. General anesthesia was induced. He was placed in the dorsal lithotomy position and prepped and draped in standard sterile fashion for a surgical procedure. Care was taken to ensure that all pressure points were adequately padded. The patient received antibacterial prophylaxis. The patient has a history of cardiac issues and had been on blood thinners but he stopped 2 days prior to surgery. The 21-Turkish cystoscope with the 30-degree lens in place was introduced via the urethra into the bladder in retrograde manner. Prostatic urethra was about 3 or so centimeters in length.   There was trilobar enlargement with the lateral lobes coapting in the midline as well as obstructing median lobe that was not intravesical. Pancystoscopy of the bladder was performed. There was no evidence of papillary or sessile bladder tumors, hemangiomas, or carcinoma in situ. Bladder was severely trabeculated. There were cellules. Ureteral orifices were identified. They were in orthotopic position. A continuous flow resectoscope was then obtained, and with the obturator in place, we passed it into the bladder without issue. We then placed the working element in the sheath. We were using the bipolar working element. Bipolar resection was initiated with the median lobe. We were able to nicely resect the median lobe. We obtained meticulous hemostasis. We then resected the right lateral lobe starting at the ventral midline and progressing to a dorsal midline. Care was taken to ensure that the resection did not go beyond the verumontanum. In similar fashion, the left lateral lobe was resected. Prostate adenoma chips irrigated out. Second look resection was done to remove some more adenoma from the floor in the left and right lateral lobes. Again, meticulous hemostasis was obtained. When this was complete that all the adenoma had been irrigated out and hemostasis was appropriate, a 26-Honduran 3-way Zuniga catheter was placed. CBI was started immediately in the operating room. He was transferred to the PACU in stable condition. IMPRESSION:  1. Benign prostatic hypertrophy obstruction. 2.  Coronary artery disease. 3.  Incomplete bladder emptying. PLAN:  The patient will be admitted for 23 hours. He will be kept on CBI. Plan will be to discharge him tomorrow with the Zuniga catheter in place.   He will follow up next week for a void trial.        Naomi Dolan MD [id number]  D:  03/11/2022 10:17  T:  03/11/2022 14:06  JOB #:  [id number]

## 2022-03-11 NOTE — BRIEF OP NOTE
Brief Postoperative Note    Patient: Vianca Villegas  YOB: 1955  MRN: [id number]    Date of Procedure: 3/11/2022     Pre-Op Diagnosis: BPH with urinary obstruction [N40.1, N13.8]    Post-Op Diagnosis: Same as preoperative diagnosis.       Procedure(s):  CYSTOSCOPY WITH BIPLOAR TRANSURETHRAL RESECTION OF PROSTATE    Surgeon(s):  Christen Stevens MD    Surgical Assistant: None    Anesthesia: General     Estimated Blood Loss (mL): less than 50     Complications: None    Specimens:   ID Type Source Tests Collected by Time Destination   1 : Prostate adenoma Fresh Prostate  Katie Monroe MD 3/11/2022 1611 Pathology        Implants: * No implants in log *    Drains: * No LDAs found *    Findings: Trilobar enlargement of prostate    Electronically Signed by Zander Gaspar MD on 3/11/2022 at 9:56 AM

## 2022-03-12 VITALS
HEIGHT: 73 IN | HEART RATE: 58 BPM | TEMPERATURE: 98.9 F | WEIGHT: 253.31 LBS | RESPIRATION RATE: 16 BRPM | BODY MASS INDEX: 33.57 KG/M2 | SYSTOLIC BLOOD PRESSURE: 124 MMHG | DIASTOLIC BLOOD PRESSURE: 60 MMHG | OXYGEN SATURATION: 95 %

## 2022-03-12 LAB
ALBUMIN SERPL-MCNC: 3.6 G/DL (ref 3.5–5)
ALBUMIN/GLOB SERPL: 1.1 {RATIO} (ref 1.1–2.2)
ALP SERPL-CCNC: 56 U/L (ref 45–117)
ALT SERPL-CCNC: 28 U/L (ref 12–78)
ANION GAP SERPL CALC-SCNC: 4 MMOL/L (ref 5–15)
AST SERPL-CCNC: 14 U/L (ref 15–37)
BASOPHILS # BLD: 0.1 K/UL (ref 0–0.1)
BASOPHILS NFR BLD: 1 % (ref 0–1)
BILIRUB SERPL-MCNC: 0.5 MG/DL (ref 0.2–1)
BUN SERPL-MCNC: 12 MG/DL (ref 6–20)
BUN/CREAT SERPL: 11 (ref 12–20)
CALCIUM SERPL-MCNC: 9 MG/DL (ref 8.5–10.1)
CHLORIDE SERPL-SCNC: 107 MMOL/L (ref 97–108)
CO2 SERPL-SCNC: 27 MMOL/L (ref 21–32)
CREAT SERPL-MCNC: 1.06 MG/DL (ref 0.7–1.3)
DIFFERENTIAL METHOD BLD: ABNORMAL
EOSINOPHIL # BLD: 0.3 K/UL (ref 0–0.4)
EOSINOPHIL NFR BLD: 2 % (ref 0–7)
ERYTHROCYTE [DISTWIDTH] IN BLOOD BY AUTOMATED COUNT: 15.3 % (ref 11.5–14.5)
GLOBULIN SER CALC-MCNC: 3.2 G/DL (ref 2–4)
GLUCOSE BLD STRIP.AUTO-MCNC: 107 MG/DL (ref 65–117)
GLUCOSE SERPL-MCNC: 120 MG/DL (ref 65–100)
HCT VFR BLD AUTO: 44.5 % (ref 36.6–50.3)
HGB BLD-MCNC: 14.6 G/DL (ref 12.1–17)
IMM GRANULOCYTES # BLD AUTO: 0.1 K/UL (ref 0–0.04)
IMM GRANULOCYTES NFR BLD AUTO: 1 % (ref 0–0.5)
LYMPHOCYTES # BLD: 3 K/UL (ref 0.8–3.5)
LYMPHOCYTES NFR BLD: 23 % (ref 12–49)
MCH RBC QN AUTO: 28.8 PG (ref 26–34)
MCHC RBC AUTO-ENTMCNC: 32.8 G/DL (ref 30–36.5)
MCV RBC AUTO: 87.8 FL (ref 80–99)
MONOCYTES # BLD: 1.2 K/UL (ref 0–1)
MONOCYTES NFR BLD: 9 % (ref 5–13)
NEUTS SEG # BLD: 8.5 K/UL (ref 1.8–8)
NEUTS SEG NFR BLD: 64 % (ref 32–75)
NRBC # BLD: 0 K/UL (ref 0–0.01)
NRBC BLD-RTO: 0 PER 100 WBC
PLATELET # BLD AUTO: 233 K/UL (ref 150–400)
PMV BLD AUTO: 9.8 FL (ref 8.9–12.9)
POTASSIUM SERPL-SCNC: 3.6 MMOL/L (ref 3.5–5.1)
PROT SERPL-MCNC: 6.8 G/DL (ref 6.4–8.2)
RBC # BLD AUTO: 5.07 M/UL (ref 4.1–5.7)
SERVICE CMNT-IMP: NORMAL
SODIUM SERPL-SCNC: 138 MMOL/L (ref 136–145)
WBC # BLD AUTO: 13.2 K/UL (ref 4.1–11.1)

## 2022-03-12 PROCEDURE — 77030040831 HC BAG URINE DRNG MDII -A

## 2022-03-12 PROCEDURE — 74011250637 HC RX REV CODE- 250/637: Performed by: UROLOGY

## 2022-03-12 PROCEDURE — 36415 COLL VENOUS BLD VENIPUNCTURE: CPT

## 2022-03-12 PROCEDURE — 82962 GLUCOSE BLOOD TEST: CPT

## 2022-03-12 PROCEDURE — 85025 COMPLETE CBC W/AUTO DIFF WBC: CPT

## 2022-03-12 PROCEDURE — 80053 COMPREHEN METABOLIC PANEL: CPT

## 2022-03-12 PROCEDURE — 77030012865 HC BG URIN LEG MDII -A

## 2022-03-12 RX ADMIN — SOTALOL HYDROCHLORIDE 120 MG: 80 TABLET ORAL at 09:15

## 2022-03-12 RX ADMIN — ACETAMINOPHEN 650 MG: 325 TABLET ORAL at 05:17

## 2022-03-12 RX ADMIN — SERTRALINE 50 MG: 50 TABLET, FILM COATED ORAL at 09:16

## 2022-03-12 RX ADMIN — METFORMIN HYDROCHLORIDE 750 MG: 750 TABLET ORAL at 09:16

## 2022-03-12 RX ADMIN — ATORVASTATIN CALCIUM 20 MG: 10 TABLET, FILM COATED ORAL at 09:15

## 2022-03-12 NOTE — PROGRESS NOTES
Bedside shift change report given to Henry King RN (oncoming nurse) by Andrei Perez RN (offgoing nurse). Report included the following information SBAR, Kardex, Intake/Output, MAR and Recent Results.

## 2022-03-12 NOTE — PROGRESS NOTES
Discharge medications reviewed with patient and appropriate educational materials and side effects teaching were provided. I have reviewed discharge instructions with the patient. Zuniga catheter teaching completed. The patient verbalized understanding.

## 2022-03-12 NOTE — PROGRESS NOTES
UROLOGY      afeb    Comfortable  Villalobos light pink on slow drip---clears completely on a few seconds of open CBI  CBI stopped    Plan for D/C today with villalobos. Arrangements made for next week for office void trial.    Prefer that he remain off Eliquis until office void trial or can clarify Monday with office unless other plans for restarting Eliquis have already been made.

## 2022-03-15 ENCOUNTER — HOSPITAL ENCOUNTER (INPATIENT)
Age: 67
LOS: 2 days | Discharge: HOME | DRG: 872 | End: 2022-03-17
Attending: EMERGENCY MEDICINE | Admitting: FAMILY MEDICINE
Payer: COMMERCIAL

## 2022-03-15 ENCOUNTER — APPOINTMENT (OUTPATIENT)
Dept: GENERAL RADIOLOGY | Age: 67
DRG: 872 | End: 2022-03-15
Attending: EMERGENCY MEDICINE
Payer: COMMERCIAL

## 2022-03-15 ENCOUNTER — APPOINTMENT (OUTPATIENT)
Dept: CT IMAGING | Age: 67
DRG: 872 | End: 2022-03-15
Attending: EMERGENCY MEDICINE
Payer: COMMERCIAL

## 2022-03-15 DIAGNOSIS — R50.82 POST-PROCEDURAL FEVER: Primary | ICD-10-CM

## 2022-03-15 PROBLEM — N39.0 UTI (URINARY TRACT INFECTION): Status: ACTIVE | Noted: 2022-03-15

## 2022-03-15 LAB
ALBUMIN SERPL-MCNC: 3.8 G/DL (ref 3.5–5)
ALBUMIN/GLOB SERPL: 0.9 {RATIO} (ref 1.1–2.2)
ALP SERPL-CCNC: 75 U/L (ref 45–117)
ALT SERPL-CCNC: 31 U/L (ref 12–78)
ANION GAP SERPL CALC-SCNC: 6 MMOL/L (ref 5–15)
APPEARANCE UR: ABNORMAL
AST SERPL-CCNC: 20 U/L (ref 15–37)
ATRIAL RATE: 81 BPM
BACTERIA URNS QL MICRO: ABNORMAL /HPF
BASOPHILS # BLD: 0.1 K/UL (ref 0–0.1)
BASOPHILS NFR BLD: 1 % (ref 0–1)
BILIRUB SERPL-MCNC: 0.5 MG/DL (ref 0.2–1)
BILIRUB UR QL: NEGATIVE
BUN SERPL-MCNC: 15 MG/DL (ref 6–20)
BUN/CREAT SERPL: 12 (ref 12–20)
CALCIUM SERPL-MCNC: 9.8 MG/DL (ref 8.5–10.1)
CALCULATED P AXIS, ECG09: 75 DEGREES
CALCULATED R AXIS, ECG10: 74 DEGREES
CALCULATED T AXIS, ECG11: 56 DEGREES
CHLORIDE SERPL-SCNC: 106 MMOL/L (ref 97–108)
CO2 SERPL-SCNC: 24 MMOL/L (ref 21–32)
COLOR UR: ABNORMAL
COMMENT, HOLDF: NORMAL
CREAT SERPL-MCNC: 1.21 MG/DL (ref 0.7–1.3)
DIAGNOSIS, 93000: NORMAL
DIFFERENTIAL METHOD BLD: ABNORMAL
EOSINOPHIL # BLD: 0.1 K/UL (ref 0–0.4)
EOSINOPHIL NFR BLD: 1 % (ref 0–7)
EPITH CASTS URNS QL MICRO: ABNORMAL /LPF
ERYTHROCYTE [DISTWIDTH] IN BLOOD BY AUTOMATED COUNT: 15.2 % (ref 11.5–14.5)
GLOBULIN SER CALC-MCNC: 4.3 G/DL (ref 2–4)
GLUCOSE BLD STRIP.AUTO-MCNC: 102 MG/DL (ref 65–117)
GLUCOSE SERPL-MCNC: 140 MG/DL (ref 65–100)
GLUCOSE UR STRIP.AUTO-MCNC: >1000 MG/DL
HCT VFR BLD AUTO: 47.9 % (ref 36.6–50.3)
HGB BLD-MCNC: 15.9 G/DL (ref 12.1–17)
HGB UR QL STRIP: ABNORMAL
HYALINE CASTS URNS QL MICRO: ABNORMAL /LPF (ref 0–5)
IMM GRANULOCYTES # BLD AUTO: 0.1 K/UL (ref 0–0.04)
IMM GRANULOCYTES NFR BLD AUTO: 1 % (ref 0–0.5)
KETONES UR QL STRIP.AUTO: NEGATIVE MG/DL
LACTATE SERPL-SCNC: 1.4 MMOL/L (ref 0.4–2)
LACTATE SERPL-SCNC: 2.2 MMOL/L (ref 0.4–2)
LEUKOCYTE ESTERASE UR QL STRIP.AUTO: ABNORMAL
LYMPHOCYTES # BLD: 0.6 K/UL (ref 0.8–3.5)
LYMPHOCYTES NFR BLD: 5 % (ref 12–49)
MCH RBC QN AUTO: 28.6 PG (ref 26–34)
MCHC RBC AUTO-ENTMCNC: 33.2 G/DL (ref 30–36.5)
MCV RBC AUTO: 86.2 FL (ref 80–99)
MONOCYTES # BLD: 0.6 K/UL (ref 0–1)
MONOCYTES NFR BLD: 5 % (ref 5–13)
NEUTS SEG # BLD: 11 K/UL (ref 1.8–8)
NEUTS SEG NFR BLD: 87 % (ref 32–75)
NITRITE UR QL STRIP.AUTO: POSITIVE
NRBC # BLD: 0 K/UL (ref 0–0.01)
NRBC BLD-RTO: 0 PER 100 WBC
P-R INTERVAL, ECG05: 164 MS
PH UR STRIP: 5.5 [PH] (ref 5–8)
PLATELET # BLD AUTO: 277 K/UL (ref 150–400)
PMV BLD AUTO: 9.7 FL (ref 8.9–12.9)
POTASSIUM SERPL-SCNC: 3.6 MMOL/L (ref 3.5–5.1)
PROT SERPL-MCNC: 8.1 G/DL (ref 6.4–8.2)
PROT UR STRIP-MCNC: 100 MG/DL
Q-T INTERVAL, ECG07: 368 MS
QRS DURATION, ECG06: 78 MS
QTC CALCULATION (BEZET), ECG08: 427 MS
RBC # BLD AUTO: 5.56 M/UL (ref 4.1–5.7)
RBC #/AREA URNS HPF: >100 /HPF (ref 0–5)
RBC MORPH BLD: ABNORMAL
SAMPLES BEING HELD,HOLD: NORMAL
SERVICE CMNT-IMP: NORMAL
SODIUM SERPL-SCNC: 136 MMOL/L (ref 136–145)
SP GR UR REFRACTOMETRY: 1.03 (ref 1–1.03)
UR CULT HOLD, URHOLD: NORMAL
UROBILINOGEN UR QL STRIP.AUTO: 0.2 EU/DL (ref 0.2–1)
VENTRICULAR RATE, ECG03: 81 BPM
WBC # BLD AUTO: 12.5 K/UL (ref 4.1–11.1)
WBC URNS QL MICRO: ABNORMAL /HPF (ref 0–4)

## 2022-03-15 PROCEDURE — 85025 COMPLETE CBC W/AUTO DIFF WBC: CPT

## 2022-03-15 PROCEDURE — 83605 ASSAY OF LACTIC ACID: CPT

## 2022-03-15 PROCEDURE — 74177 CT ABD & PELVIS W/CONTRAST: CPT

## 2022-03-15 PROCEDURE — 80053 COMPREHEN METABOLIC PANEL: CPT

## 2022-03-15 PROCEDURE — 74011250636 HC RX REV CODE- 250/636: Performed by: FAMILY MEDICINE

## 2022-03-15 PROCEDURE — 87086 URINE CULTURE/COLONY COUNT: CPT

## 2022-03-15 PROCEDURE — 65660000000 HC RM CCU STEPDOWN

## 2022-03-15 PROCEDURE — 74011000250 HC RX REV CODE- 250: Performed by: FAMILY MEDICINE

## 2022-03-15 PROCEDURE — 74011250637 HC RX REV CODE- 250/637: Performed by: FAMILY MEDICINE

## 2022-03-15 PROCEDURE — 82962 GLUCOSE BLOOD TEST: CPT

## 2022-03-15 PROCEDURE — 87040 BLOOD CULTURE FOR BACTERIA: CPT

## 2022-03-15 PROCEDURE — 36415 COLL VENOUS BLD VENIPUNCTURE: CPT

## 2022-03-15 PROCEDURE — 74011250637 HC RX REV CODE- 250/637: Performed by: NURSE PRACTITIONER

## 2022-03-15 PROCEDURE — 74011250637 HC RX REV CODE- 250/637: Performed by: EMERGENCY MEDICINE

## 2022-03-15 PROCEDURE — 71045 X-RAY EXAM CHEST 1 VIEW: CPT

## 2022-03-15 PROCEDURE — 81001 URINALYSIS AUTO W/SCOPE: CPT

## 2022-03-15 PROCEDURE — 94660 CPAP INITIATION&MGMT: CPT

## 2022-03-15 PROCEDURE — 74011000636 HC RX REV CODE- 636: Performed by: RADIOLOGY

## 2022-03-15 PROCEDURE — 93005 ELECTROCARDIOGRAM TRACING: CPT

## 2022-03-15 PROCEDURE — 96366 THER/PROPH/DIAG IV INF ADDON: CPT

## 2022-03-15 PROCEDURE — 87186 SC STD MICRODIL/AGAR DIL: CPT

## 2022-03-15 PROCEDURE — 87077 CULTURE AEROBIC IDENTIFY: CPT

## 2022-03-15 PROCEDURE — 74011250636 HC RX REV CODE- 250/636: Performed by: EMERGENCY MEDICINE

## 2022-03-15 PROCEDURE — 96365 THER/PROPH/DIAG IV INF INIT: CPT

## 2022-03-15 PROCEDURE — 99285 EMERGENCY DEPT VISIT HI MDM: CPT

## 2022-03-15 RX ORDER — ACETAMINOPHEN 325 MG/1
650 TABLET ORAL ONCE
Status: COMPLETED | OUTPATIENT
Start: 2022-03-15 | End: 2022-03-15

## 2022-03-15 RX ORDER — SODIUM CHLORIDE 0.9 % (FLUSH) 0.9 %
5-40 SYRINGE (ML) INJECTION EVERY 8 HOURS
Status: DISCONTINUED | OUTPATIENT
Start: 2022-03-15 | End: 2022-03-17 | Stop reason: HOSPADM

## 2022-03-15 RX ORDER — SODIUM CHLORIDE 0.9 % (FLUSH) 0.9 %
5-40 SYRINGE (ML) INJECTION AS NEEDED
Status: DISCONTINUED | OUTPATIENT
Start: 2022-03-15 | End: 2022-03-17 | Stop reason: HOSPADM

## 2022-03-15 RX ORDER — MAGNESIUM SULFATE 100 %
4 CRYSTALS MISCELLANEOUS AS NEEDED
Status: DISCONTINUED | OUTPATIENT
Start: 2022-03-15 | End: 2022-03-16

## 2022-03-15 RX ORDER — SODIUM CHLORIDE 9 MG/ML
75 INJECTION, SOLUTION INTRAVENOUS CONTINUOUS
Status: DISCONTINUED | OUTPATIENT
Start: 2022-03-15 | End: 2022-03-17 | Stop reason: HOSPADM

## 2022-03-15 RX ORDER — LEVOFLOXACIN 5 MG/ML
750 INJECTION, SOLUTION INTRAVENOUS ONCE
Status: COMPLETED | OUTPATIENT
Start: 2022-03-15 | End: 2022-03-15

## 2022-03-15 RX ORDER — SOTALOL HYDROCHLORIDE 80 MG/1
120 TABLET ORAL 2 TIMES DAILY
Status: DISCONTINUED | OUTPATIENT
Start: 2022-03-15 | End: 2022-03-17 | Stop reason: HOSPADM

## 2022-03-15 RX ORDER — ACETAMINOPHEN 650 MG/1
650 SUPPOSITORY RECTAL
Status: DISCONTINUED | OUTPATIENT
Start: 2022-03-15 | End: 2022-03-17 | Stop reason: HOSPADM

## 2022-03-15 RX ORDER — TRAZODONE HYDROCHLORIDE 100 MG/1
100 TABLET ORAL
Status: DISCONTINUED | OUTPATIENT
Start: 2022-03-15 | End: 2022-03-17 | Stop reason: HOSPADM

## 2022-03-15 RX ORDER — AMOXICILLIN 250 MG
2 CAPSULE ORAL 2 TIMES DAILY
Status: DISCONTINUED | OUTPATIENT
Start: 2022-03-15 | End: 2022-03-17 | Stop reason: HOSPADM

## 2022-03-15 RX ORDER — POLYETHYLENE GLYCOL 3350 17 G/17G
17 POWDER, FOR SOLUTION ORAL DAILY PRN
Status: DISCONTINUED | OUTPATIENT
Start: 2022-03-15 | End: 2022-03-17 | Stop reason: HOSPADM

## 2022-03-15 RX ORDER — SODIUM CHLORIDE 0.9 % (FLUSH) 0.9 %
5-10 SYRINGE (ML) INJECTION AS NEEDED
Status: DISCONTINUED | OUTPATIENT
Start: 2022-03-15 | End: 2022-03-17 | Stop reason: HOSPADM

## 2022-03-15 RX ORDER — TEMAZEPAM 15 MG/1
15 CAPSULE ORAL
Status: DISCONTINUED | OUTPATIENT
Start: 2022-03-15 | End: 2022-03-17 | Stop reason: HOSPADM

## 2022-03-15 RX ORDER — POLYETHYLENE GLYCOL 3350 17 G/17G
17 POWDER, FOR SOLUTION ORAL ONCE
Status: COMPLETED | OUTPATIENT
Start: 2022-03-15 | End: 2022-03-15

## 2022-03-15 RX ORDER — ACETAMINOPHEN 325 MG/1
650 TABLET ORAL
Status: COMPLETED | OUTPATIENT
Start: 2022-03-15 | End: 2022-03-15

## 2022-03-15 RX ORDER — ACETAMINOPHEN 325 MG/1
650 TABLET ORAL
Status: DISCONTINUED | OUTPATIENT
Start: 2022-03-15 | End: 2022-03-17 | Stop reason: HOSPADM

## 2022-03-15 RX ORDER — ONDANSETRON 2 MG/ML
4 INJECTION INTRAMUSCULAR; INTRAVENOUS
Status: DISCONTINUED | OUTPATIENT
Start: 2022-03-15 | End: 2022-03-17 | Stop reason: HOSPADM

## 2022-03-15 RX ORDER — INSULIN LISPRO 100 [IU]/ML
INJECTION, SOLUTION INTRAVENOUS; SUBCUTANEOUS
Status: DISCONTINUED | OUTPATIENT
Start: 2022-03-15 | End: 2022-03-16

## 2022-03-15 RX ADMIN — APIXABAN 5 MG: 5 TABLET, FILM COATED ORAL at 19:40

## 2022-03-15 RX ADMIN — POLYETHYLENE GLYCOL 3350 17 G: 17 POWDER, FOR SOLUTION ORAL at 19:40

## 2022-03-15 RX ADMIN — IOPAMIDOL 100 ML: 755 INJECTION, SOLUTION INTRAVENOUS at 17:49

## 2022-03-15 RX ADMIN — SODIUM CHLORIDE, PRESERVATIVE FREE 10 ML: 5 INJECTION INTRAVENOUS at 21:51

## 2022-03-15 RX ADMIN — SOTALOL HYDROCHLORIDE 120 MG: 80 TABLET ORAL at 20:38

## 2022-03-15 RX ADMIN — TRAZODONE HYDROCHLORIDE 100 MG: 100 TABLET ORAL at 21:45

## 2022-03-15 RX ADMIN — SODIUM CHLORIDE 3330 ML: 9 INJECTION, SOLUTION INTRAVENOUS at 14:17

## 2022-03-15 RX ADMIN — ACETAMINOPHEN 650 MG: 325 TABLET ORAL at 21:45

## 2022-03-15 RX ADMIN — ACETAMINOPHEN 650 MG: 325 TABLET ORAL at 17:13

## 2022-03-15 RX ADMIN — SODIUM CHLORIDE 75 ML/HR: 9 INJECTION, SOLUTION INTRAVENOUS at 20:40

## 2022-03-15 RX ADMIN — WATER 1 G: 1 INJECTION INTRAMUSCULAR; INTRAVENOUS; SUBCUTANEOUS at 18:55

## 2022-03-15 RX ADMIN — SENNOSIDES AND DOCUSATE SODIUM 2 TABLET: 50; 8.6 TABLET ORAL at 19:40

## 2022-03-15 RX ADMIN — TEMAZEPAM 15 MG: 15 CAPSULE ORAL at 21:50

## 2022-03-15 RX ADMIN — LEVOFLOXACIN 750 MG: 750 INJECTION, SOLUTION INTRAVENOUS at 14:18

## 2022-03-15 NOTE — CONSULTS
Requesting Provider: Dhruv Day MD - Reason for Consultation: \"s/p TURP\"  Pre-existing [facility] Patient:   yes                Patient: Zelda Barthel MRN: [id number]  SSN: [id number]    YOB: 1955  Age: 77 y.o. Sex: male     Location: [place] [address]       Code Status: No Order   PCP: Aramis Camarillo MD  - [phone]   Emergency Contact:  Primary Emergency Contact: esvin whatley, Home Phone: [phone]   Race/Lutheran/Language: Benoit Blunt / Darcus Kandace / [place]    Payor: Payor: Zoila Danielle / Plan: Treinta PEDRO Slaters [id number] PPO / Product Type: PPO /    Prior Admission Data: 3/12/22 [facility] 5E2 SURGICAL UNIT Lonnie MYERS   Hospitalized:  Hospital Day: 1 - Admitted 3/15/2022 12:09 PM     CONSULTANTS  IP CONSULT TO UROLOGY   ADMISSION DIAGNOSES  No diagnosis found. Assessment/Plan:       · Sepsis  · Hx of BPH s/p TURP 3/11/22    - Recommend admission to the hospitalist service for management of sepsis and medical problems. - UA consistent with infection. Agree with empiric tx with broad spectrum abx. Follow cultures and tailor therapy. - Bladder scan today unremarkable, voiding dark yellow UA without clots. Okay to continue to void independently at this time. Case discussed with Dr. Damon Villafuerte      CC: Chills and Post-Op Problem   HPI: He is a 77 y.o. male with a pmh of BPH and incomplete emptying, followed by Dr. Damon Villafuerte at . He underwent at TURP on 3/11/22 and passed his VT on 3/14. He was seen by Dr. Damon Villafuerte in our office earlier this morning for chills and a fever to 102.7 F. Bladder scan 217 cc, he was able to void 150 cc. He was given a dose of IM Rocephin and subsequently referred to the ED for w/u of sepsis. In the ED he is afebrile. HR wnl. BP stable. Mild leukocytosis to 12.5. Lactic acid 2.2. Hgb 15.9. Cr 1.21. UA + nitrites, small leuks,  WBC, >100 RBC, 2+ bacteria. Urine and blood cultures pending. He has been started on Levaquin.      He complains of a headache. He denies further fevers or chills. Admits lower midline abdominal pain that started this AM, now slightly improved. Some associated intermittent dysuria. Denies flank pain. He is voiding independently. UA at the bedside is dark yellow. Temp (24hrs), Av.3 °F (36.8 °C), Min:98.2 °F (36.8 °C), Max:98.4 °F (36.9 °C)       Creatinine   Date/Time Value Ref Range Status   03/15/2022 10:27 AM 1.21 0.70 - 1.30 MG/DL Final   2022 09:18 AM 1.06 0.70 - 1.30 MG/DL Final     Current Antimicrobial Therapy (168h ago, onward)     Ordered     Start Stop    03/15/22 1348  levoFLOXacin (LEVAQUIN) 750 mg in D5W IVPB  750 mg,   IntraVENous,   ONCE        References:    Lexicomp    03/15/22 1356 22 0155              Key Anti-Platelet Anticoagulant Meds             apixaban (Eliquis) 5 mg tablet Take 5 mg by mouth two (2) times a day.         Diet: No diet orders on file -       Labs     Lab Results   Component Value Date/Time    Lactic acid 2.2 (HH) 03/15/2022 10:27 AM    WBC 12.5 (H) 03/15/2022 10:27 AM    HCT 47.9 03/15/2022 10:27 AM    PLATELET 734  10:27 AM    Sodium 136 03/15/2022 10:27 AM    Potassium 3.6 03/15/2022 10:27 AM    Chloride 106 03/15/2022 10:27 AM    CO2 24 03/15/2022 10:27 AM    BUN 15 03/15/2022 10:27 AM    Creatinine 1.21 03/15/2022 10:27 AM    Glucose 140 (H) 03/15/2022 10:27 AM    Calcium 9.8 03/15/2022 10:27 AM     UA:   Lab Results   Component Value Date/Time    Color YELLOW/STRAW 03/15/2022 10:27 AM    Appearance CLOUDY (A) 03/15/2022 10:27 AM    Specific gravity 1.029 03/15/2022 10:27 AM    pH (UA) 5.5 03/15/2022 10:27 AM    Protein 100 (A) 03/15/2022 10:27 AM    Glucose >1,000 (A) 03/15/2022 10:27 AM    Ketone Negative 03/15/2022 10:27 AM    Bilirubin Negative 03/15/2022 10:27 AM    Urobilinogen 0.2 03/15/2022 10:27 AM    Nitrites Positive (A) 03/15/2022 10:27 AM    Leukocyte Esterase SMALL (A) 03/15/2022 10:27 AM    Epithelial cells FEW 03/15/2022 10:27 AM    Bacteria 2+ (A) 03/15/2022 10:27 AM    WBC  03/15/2022 10:27 AM    RBC >100 (H) 03/15/2022 10:27 AM     Imaging     No results found for this or any previous visit. US Results (most recent):  No results found for this or any previous visit. Cultures     All Micro Results     Procedure Component Value Units Date/Time    CULTURE, BLOOD, PAIRED [[id number]] Collected: 03/15/22 1027    Order Status: Completed Specimen: Blood Updated: 03/15/22 1419    CULTURE, URINE [[id number]] Collected: 03/15/22 1027    Order Status: Completed Specimen: Urine from Clean catch Updated: 03/15/22 1410    CULTURE, URINE [[id number]]     Order Status: Canceled Specimen: Urine from [address] [place] [address] [[id number]] Collected: 03/15/22 1027    Order Status: Completed Specimen: Urine from Serum Updated: 03/15/22 1059     Urine culture hold       Urine on hold in Microbiology dept for 2 days. If unpreserved urine is submitted, it cannot be used for addtional testing after 24 hours, recollection will be required. Past History: (Complete 2+/3 areas)     Allergies   Allergen Reactions    Erythromycin Rash    Pcn [Penicillins] Rash     AS CHILD. PT DOES NOT KNOW SEVERITY        Current Facility-Administered Medications   Medication Dose Route Frequency    sodium chloride (NS) flush 5-10 mL  5-10 mL IntraVENous PRN    levoFLOXacin (LEVAQUIN) 750 mg in D5W IVPB  750 mg IntraVENous ONCE     Current Outpatient Medications   Medication Sig    dulaglutide (Trulicity) 0.93 GW/5.2 mL sub-q pen 0.75 mg by SubCUTAneous route every seven (7) days. PT TAKES ON SUNDAY    metFORMIN ER (GLUCOPHAGE XR) 750 mg tablet Take 750 mg by mouth two (2) times a day.  sotaloL (BETAPACE) 120 mg tablet Take 120 mg by mouth two (2) times a day.  allopurinoL (ZYLOPRIM) 300 mg tablet Take 300 mg by mouth nightly.  simvastatin (ZOCOR) 40 mg tablet Take 40 mg by mouth nightly.     apixaban (Eliquis) 5 mg tablet Take 5 mg by mouth two (2) times a day.  sertraline (ZOLOFT) 50 mg tablet Take 50 mg by mouth daily.  empagliflozin (Jardiance) 25 mg tablet Take 25 mg by mouth Every morning.  traZODone (DESYREL) 100 mg tablet Take 200 mg by mouth nightly.  temazepam (RESTORIL) 15 mg capsule Take 15 mg by mouth nightly.  ascorbic acid, vitamin C, (Vitamin C) 500 mg tablet Take 1,000 mg by mouth daily.  multivitamin (ONE A DAY) tablet Take 1 Tablet by mouth daily.  pyridoxine, vitamin B6, (Vitamin B-6) 100 mg tablet Take 100 mg by mouth. Monday AND FRIDAY    cyanocobalamin (Vitamin B-12) 1,000 mcg tablet Take 2,000 mcg by mouth. Monday AND FRIDAY    Prior to Admission medications    Medication Sig Start Date End Date Taking? Authorizing Provider   dulaglutide (Trulicity) 3.50 IH/6.0 mL sub-q pen 0.75 mg by SubCUTAneous route every seven (7) days. PT TAKES ON SUNDAY    Provider, Historical   metFORMIN ER (GLUCOPHAGE XR) 750 mg tablet Take 750 mg by mouth two (2) times a day. Provider, Historical   sotaloL (BETAPACE) 120 mg tablet Take 120 mg by mouth two (2) times a day. Provider, Historical   allopurinoL (ZYLOPRIM) 300 mg tablet Take 300 mg by mouth nightly. Provider, Historical   simvastatin (ZOCOR) 40 mg tablet Take 40 mg by mouth nightly. Provider, Historical   apixaban (Eliquis) 5 mg tablet Take 5 mg by mouth two (2) times a day. Provider, Historical   sertraline (ZOLOFT) 50 mg tablet Take 50 mg by mouth daily. Provider, Historical   empagliflozin (Jardiance) 25 mg tablet Take 25 mg by mouth Every morning. Provider, Historical   traZODone (DESYREL) 100 mg tablet Take 200 mg by mouth nightly. Provider, Historical   temazepam (RESTORIL) 15 mg capsule Take 15 mg by mouth nightly. Provider, Historical   ascorbic acid, vitamin C, (Vitamin C) 500 mg tablet Take 1,000 mg by mouth daily. Provider, Historical   multivitamin (ONE A DAY) tablet Take 1 Tablet by mouth daily.     Provider, Historical pyridoxine, vitamin B6, (Vitamin B-6) 100 mg tablet Take 100 mg by mouth. Monday AND FRIDAY    Provider, Historical   cyanocobalamin (Vitamin B-12) 1,000 mcg tablet Take 2,000 mcg by mouth. Monday AND FRIDAY    Provider, Historical        PMHx:  has a past medical history of Arrhythmia, COVID-19 vaccine series completed, Depression, Diabetes ([address] Gout, Hypertension, Insomnia, and Sleep apnea. PSurgHx:  has a past surgical history that includes pr cardiac surg procedure unlist (2018, 2020); pr cardiac surg procedure unlist (08/2021); hx knee replacement (Right, 2007); neurological procedure unlisted (1997); and hx colonoscopy. PSocHx:  reports that he has never smoked. He has never used smokeless tobacco. He reports previous alcohol use. He reports that he does not use drugs.    ROS:  (Complete - 10 systems) - DENIES: Weightloss (Constitutional), Dry mouth (ENMT), Chest pain (CV), SOB (Respiratory), Constipation (GI), Weakness (MS), Pallor (Skin), TIA Sx (Neuro), Confusion (Psych), Easy bruising (Heme)    Physical Exam: (Comprehesive - 8+ 1995 Systems)     (1) Constitutional:  NAD; pleasant  FIO2:   on SpO2: O2 Sat (%): 93 %  O2 Device: None (Room air)    Patient Vitals for the past 24 hrs:   BP Temp Pulse Resp SpO2 Height Weight   03/15/22 1330 107/71 -- 76 21 93 % -- --   03/15/22 1315 105/68 -- 75 17 93 % -- --   03/15/22 1245 114/68 -- 75 24 92 % -- --   03/15/22 1230 102/66 -- 74 17 93 % -- --   03/15/22 1220 105/65 98.4 °F (36.9 °C) 72 18 96 % -- --   03/15/22 1014 110/69 98.2 °F (36.8 °C) 82 22 94 % 6' 3\" (1.905 m) 111 kg (244 lb 11.4 oz)          (2) ENMT:   moist mucous membranes, normal sinuses   (3) Respiratory:  breathing easily, no distress   (4) GI:  no abdominal masses, no tenderness   (5) :   Voiding independently, dark yellow UA, no CVA tenderness   (6) Lymphatic:  no adenopathy, neck supple   (7) Muscloskeletal:  no gross deformity, normal ROM   (8) Skin:  no rash, warm & dry   (9) Neuro: Alert, no focal deficits, normal speech      Signed By: Kendall Keene, DANIELE  - March 15, 2022

## 2022-03-15 NOTE — ED TRIAGE NOTES
Patient is coming in with fever that started this morning at 0700. Patient had bladder surgery on Friday and was discharged on Saturday. Fever up to 102 today. Patient was seen at Urology office and was given antibiotic shot.

## 2022-03-15 NOTE — ED NOTES
Has a final transfer review been performed? Yes    Reason for Admission: UTI after TURP procedure   Patient comes from: Home  Mental Status: Alert and oriented  ADL:self care  Ambulation:no difficulty  Pertinent Info/Safety Concerns: none  COVID Status: Not Indicated  MEWS Score: 1  Vitals:    03/15/22 1600 03/15/22 1630 03/15/22 1730 03/15/22 1800   BP: 116/89 110/67 123/75 115/65   Pulse: 67 69 72 74   Resp: 24 14 14 16   Temp:   98.6 °F (37 °C) 98.5 °F (36.9 °C)   SpO2: 97% 97% 98% 94%   Weight:       Height:         Transport mode:Wheelchair    TRANSFER - OUT REPORT:         Report consisted of patient's Situation, Background, Assessment and   Recommendations(SBAR). Information from the following report(s) SBAR, Kardex, ED Summary, [facility] and Recent Results was reviewed with the receiving nurse. Lines:   Peripheral IV 03/15/22 Right Antecubital (Active)   Site Assessment Clean, dry, & intact 03/15/22 1310   Phlebitis Assessment 0 03/15/22 1310   Infiltration Assessment 0 03/15/22 1310   Dressing Status Clean, dry, & intact 03/15/22 1310   Hub Color/Line Status Pink 03/15/22 1310        Opportunity for questions and clarification was provided.     Outstanding meds will be given

## 2022-03-15 NOTE — ED PROVIDER NOTES
This is a 51-year-old male who is admitted from the urologist office for fever to 102 and chills from earlier today. Is noted on the 11th of this month he had cystoscopy with bipolar transurethral resection. He is having some increased pain in the lower abdomen. Earlier he had had some burning in his penis with urination. He has also been passing some blood. He does have a history of BPH with urinary obstruction. He is having at this point just some fever and chills. He was given an injection of antibiotic at the urologist office and sent here for further evaluation. The patient has had no nausea or vomiting. He denies any bowel issues. He has had no chest pain or shortness of breath. Patient voices no other acute complaint. Patient does have a history of a flutter and diabetes with hypertension.            Past Medical History:   Diagnosis Date    Arrhythmia     A- FLUTTER  A-FIB CARDIOVERSION 8/2021, CARDIAC ABLATION 2018, 2020    COVID-19 vaccine series completed     Depression     Diabetes ([facility] [address]     Gout     Hypertension     Insomnia     Sleep apnea     CPAP       Past Surgical History:   Procedure Laterality Date    HX COLONOSCOPY      HX KNEE REPLACEMENT Right 2007    NEUROLOGICAL PROCEDURE UNLISTED  1997    LUMBAR 4-5 DISCECTOMY    [place] CARDIAC SURG PROCEDURE UNLIST  2018, 2020    CARDIAC ABLATION    [place] CARDIAC SURG PROCEDURE UNLIST  08/2021    CARDIOVERSION X 4 OR 5         Family History:   Problem Relation Age of Onset    Diabetes Mother     Dementia Mother     Hypertension Mother     Diabetes Father     Hypertension Father     Diabetes Sister     Diabetes Brother     Kidney Disease Brother     Heart Disease Maternal Grandfather     Diabetes Sister     Diabetes Sister     Anesth Problems Neg Hx        Social History     Socioeconomic History    Marital status:      Spouse name: Not on file    Number of children: Not on file    Years of education: Not on file   Mayi Nunez Highest education level: Not on file   Occupational History    Not on file   Tobacco Use    Smoking status: Never Smoker    Smokeless tobacco: Never Used   Vaping Use    Vaping Use: Never used   Substance and Sexual Activity    Alcohol use: Not Currently    Drug use: Never    Sexual activity: Not on file   Other Topics Concern    Not on file   Social History Narrative    Not on file     Social Determinants of Health     Financial Resource Strain:     Difficulty of Paying Living Expenses: Not on file   Food Insecurity:     Worried About Running Out of Food in the Last Year: Not on file    Nory of Food in the Last Year: Not on file   Transportation Needs:     Lack of Transportation (Medical): Not on file    Lack of Transportation (Non-Medical): Not on file   Physical Activity:     Days of Exercise per Week: Not on file    Minutes of Exercise per Session: Not on file   Stress:     Feeling of Stress : Not on file   Social Connections:     Frequency of Communication with Friends and Family: Not on file    Frequency of Social Gatherings with Friends and Family: Not on file    Attends Christianity Services: Not on file    Active Member of [address] [place] [address] or Organizations: Not on file    Attends Club or Organization Meetings: Not on file    Marital Status: Not on file   Intimate Partner Violence:     Fear of Current or Ex-Partner: Not on file    Emotionally Abused: Not on file    Physically Abused: Not on file    Sexually Abused: Not on file   Housing Stability:     Unable to Pay for Housing in the Last Year: Not on file    Number of [place] in the Last Year: Not on file    Unstable Housing in the Last Year: Not on file         ALLERGIES: Erythromycin and Pcn [penicillins]    Review of Systems   Constitutional: Positive for chills and fever. Negative for activity change, appetite change and fatigue. HENT: Negative for ear pain, facial swelling, sore throat and trouble swallowing.     Eyes: Negative for pain, discharge and visual disturbance. Respiratory: Negative for chest tightness, shortness of breath and wheezing. Cardiovascular: Negative for chest pain and palpitations. Gastrointestinal: Positive for abdominal pain ( There is some slight discomfort in the suprapubic region. There was some mild discomfort in the left lower back. ). Negative for blood in stool, nausea and vomiting. Genitourinary: Negative for difficulty urinating, flank pain and hematuria. Musculoskeletal: Negative for arthralgias, joint swelling, myalgias and neck pain. Skin: Negative for color change and rash. Neurological: Negative for dizziness, weakness, numbness and headaches. Hematological: Negative for adenopathy. Does not bruise/bleed easily. Psychiatric/Behavioral: Negative for behavioral problems, confusion and sleep disturbance. All other systems reviewed and are negative. Vitals:    03/15/22 1014 03/15/22 1220   BP: 110/69 105/65   Pulse: 82 72   Resp: 22 18   Temp: 98.2 °F (36.8 °C) 98.4 °F (36.9 °C)   SpO2: 94% 96%   Weight: 111 kg (244 lb 11.4 oz)    Height: 6' 3\" (1.905 m)             Physical Exam  Vitals and nursing note reviewed. Constitutional:       General: He is not in acute distress. Appearance: He is well-developed. Comments: This is a 26-year-old male who has a history of BPH and recent transurethral resection. Presents with fever to 102 and chills. Vital signs as currently noted. HENT:      Head: Normocephalic and atraumatic. Nose: Nose normal.   Eyes:      General: No scleral icterus. Conjunctiva/sclera: Conjunctivae normal.      Pupils: Pupils are equal, round, and reactive to light. Neck:      Thyroid: No thyromegaly. Vascular: No JVD. Trachea: No tracheal deviation. Comments: No carotid bruits noted. Cardiovascular:      Rate and Rhythm: Normal rate and regular rhythm. Heart sounds: Normal heart sounds. No murmur heard. No friction rub. No gallop. Pulmonary:      Effort: Pulmonary effort is normal. No respiratory distress. Breath sounds: Normal breath sounds. No wheezing or rales. Chest:      Chest wall: No tenderness. Abdominal:      General: Bowel sounds are normal. There is no distension. Palpations: Abdomen is soft. There is no mass. Tenderness: There is abdominal tenderness ( There is some localized tenderness to palpation over the pubis. Abdomen is otherwise unimpressive. ). There is no guarding or rebound. Musculoskeletal:         General: No tenderness. Normal range of motion. Cervical back: Normal range of motion and neck supple. Lymphadenopathy:      Cervical: No cervical adenopathy. Skin:     General: Skin is warm and dry. Findings: No erythema or rash. Neurological:      Mental Status: He is alert and oriented to person, place, and time. Cranial Nerves: No cranial nerve deficit. Coordination: Coordination normal.      Deep Tendon Reflexes: Reflexes are normal and symmetric. Psychiatric:         Behavior: Behavior normal.         Thought Content:  Thought content normal.         Judgment: Judgment normal.          MDM  Number of Diagnoses or Management Options  Post-procedural fever: new and requires workup     Amount and/or Complexity of Data Reviewed  Clinical lab tests: ordered and reviewed  Tests in the radiology section of CPT®: ordered and reviewed  Decide to obtain previous medical records or to obtain history from someone other than the patient: yes  Review and summarize past medical records: yes  Discuss the patient with other providers: yes  Independent visualization of images, tracings, or specimens: yes    Risk of Complications, Morbidity, and/or Mortality  Presenting problems: high  Diagnostic procedures: high  Management options: high    Patient Progress  Patient progress: stable         Procedures    This is a 78-year-old male who presents with difficulty with suprapubic discomfort. He is also had fever and chills after a urological procedure on Saturday. He was seen by urology and sent here after giving him a dose of IM antibiotics. He clinically presents for sepsis. We will consult the urologist and have the patient admitted to medicine. The other question is whether urology would like imaging studies done. Perfect Serve Consult for Admission  5:41 PM    ED Room Number: ER23/23  Patient Name and age:  Carmelina Cowden 77 y.o.  male  Working Diagnosis:   1. Post-procedural fever        COVID-19 Suspicion:  no  Sepsis present:  yes  Reassessment needed: yes  Code Status:  Full Code  Readmission: no  Isolation Requirements:  no  Recommended Level of Care:  telemetry  Department:[facility] Adult ED - 21   Other:  Urology consulted    The second call was placed to urology.

## 2022-03-15 NOTE — PROGRESS NOTES
Admission Medication Reconciliation:    Information obtained from:  Patient  RxQuery data available¹:  YES    Comments/Recommendations: Updated PTA meds/reviewed patient's allergies. 1)  Patient reports he has resumed all his maintenance medications post surgery including Eliquis and metformin, except for his vitamins. Patient reports his Zoloft has been changed to trazodone plus temazepam.    2)  Medication changes (since last review): Adjusted  - Trazodone 100mg 2tabs po nightly --> now taking 50mg 2tabs po nightly (100mg)    Removed  - Sertraline 50mg 1tab po dailyi    3)  Patient reports that he had tried azithromycin in the past and that it \"threw off\" his heart rhythm (he has a.fib). Patient also taking sotalol and trazodone; possible QTc prolongation risk noted. ¹RxQuery pharmacy benefit data reflects medications filled and processed through the patient's insurance, however   this data does NOT capture whether the medication was picked up or is currently being taken by the patient. Allergies:  Azithromycin, Erythromycin, and Pcn [penicillins]    Significant PMH/Disease States:   Past Medical History:   Diagnosis Date    Arrhythmia     A- FLUTTER  A-FIB CARDIOVERSION 8/2021, CARDIAC ABLATION 2018, 2020    COVID-19 vaccine series completed     Depression     Diabetes ([facility] [address]     Gout     Hypertension     Insomnia     Sleep apnea     CPAP     Chief Complaint for this Admission:    Chief Complaint   Patient presents with    Chills    Post-Op Problem     Prior to Admission Medications:   Prior to Admission Medications   Prescriptions Last Dose Informant Taking?   allopurinoL (ZYLOPRIM) 300 mg tablet   Yes   Sig: Take 300 mg by mouth nightly. apixaban (Eliquis) 5 mg tablet   Yes   Sig: Take 5 mg by mouth two (2) times a day. ascorbic acid, vitamin C, (Vitamin C) 500 mg tablet   No   Sig: Take 1,000 mg by mouth daily.    cyanocobalamin (Vitamin B-12) 1,000 mcg tablet   No   Sig: Take 2,000 mcg by mouth. Monday AND FRIDAY   dulaglutide (Trulicity) 6.40 OM/6.7 mL sub-q pen   Yes   Si.75 mg by SubCUTAneous route every seven (7) days. PT TAKES ON    empagliflozin (Jardiance) 25 mg tablet   Yes   Sig: Take 25 mg by mouth Every morning. metFORMIN ER (GLUCOPHAGE XR) 750 mg tablet   Yes   Sig: Take 750 mg by mouth two (2) times a day. multivitamin (ONE A DAY) tablet   No   Sig: Take 1 Tablet by mouth daily. pyridoxine, vitamin B6, (Vitamin B-6) 100 mg tablet   No   Sig: Take 100 mg by mouth. Monday AND FRIDAY   simvastatin (ZOCOR) 40 mg tablet   Yes   Sig: Take 40 mg by mouth nightly. sotaloL (BETAPACE) 120 mg tablet   Yes   Sig: Take 120 mg by mouth two (2) times a day. temazepam (RESTORIL) 15 mg capsule   Yes   Sig: Take 15 mg by mouth nightly. traZODone (DESYREL) 50 mg tablet   Yes   Sig: Take 100 mg by mouth nightly. Facility-Administered Medications: None     Please contact the main inpatient pharmacy with any questions or concerns at [phone] and we will direct you to the clinical pharmacist covering this patient's care while in-house.    Marilu Simon, SATYAD

## 2022-03-15 NOTE — H&P
[address] [facility] Adult  Hospitalist Group  History and Physical    Date of Service:  3/15/2022  Primary Care Provider: Fabio Banda MD  Source of information: Patient and chart    Chief Complaint: Chills and Post-Op Problem      History of Presenting Illness:   Jayson Crouch is a 77 y.o. male who presents with fevers. He arrived from the urologist office for fever to 102 and chills from earlier today. He had cystoscopy on 3/11/22 with  Transurethral prostate resection. He is having some increased pain in the lower abdomen. Earlier he had had some dysuria. He has also been having hematuria. He does have a history of BPH with urinary obstruction. He was given an injection of antibiotic at the urologist office and sent here for further evaluation. The patient has had no nausea or vomiting. He denies any bowel issues. He has had no chest pain or shortness of breath. Patient voices no other acute complaint. Patient does have a history of a flutter and diabetes with hypertension. REVIEW OF SYSTEMS:  A comprehensive review of systems was negative except for that written in the History of Present Illness. Past Medical History:   Diagnosis Date    Arrhythmia     A- FLUTTER  A-FIB CARDIOVERSION 8/2021, CARDIAC ABLATION 2018, 2020    COVID-19 vaccine series completed     Depression     Diabetes ([facility] [address]     Gout     Hypertension     Insomnia     Sleep apnea     CPAP      Past Surgical History:   Procedure Laterality Date    HX COLONOSCOPY      HX KNEE REPLACEMENT Right 2007    NEUROLOGICAL PROCEDURE UNLISTED  1997    LUMBAR 4-5 DISCECTOMY    [place] CARDIAC SURG PROCEDURE UNLIST  2018, 2020    CARDIAC ABLATION    [place] CARDIAC SURG PROCEDURE UNLIST  08/2021    CARDIOVERSION X 4 OR 5     Prior to Admission medications    Medication Sig Start Date End Date Taking? Authorizing Provider   dulaglutide (Trulicity) 1.42 KJ/6.9 mL sub-q pen 0.75 mg by SubCUTAneous route every seven (7) days.  PT TAKES ON SUNDAY   Yes Provider, Historical   metFORMIN ER (GLUCOPHAGE XR) 750 mg tablet Take 750 mg by mouth two (2) times a day. Yes Provider, Historical   sotaloL (BETAPACE) 120 mg tablet Take 120 mg by mouth two (2) times a day. Yes Provider, Historical   allopurinoL (ZYLOPRIM) 300 mg tablet Take 300 mg by mouth nightly. Yes Provider, Historical   simvastatin (ZOCOR) 40 mg tablet Take 40 mg by mouth nightly. Yes Provider, Historical   apixaban (Eliquis) 5 mg tablet Take 5 mg by mouth two (2) times a day. Yes Provider, Historical   empagliflozin (Jardiance) 25 mg tablet Take 25 mg by mouth Every morning. Yes Provider, Historical   traZODone (DESYREL) 50 mg tablet Take 100 mg by mouth nightly. Yes Provider, Historical   temazepam (RESTORIL) 15 mg capsule Take 15 mg by mouth nightly. Yes Provider, Historical   ascorbic acid, vitamin C, (Vitamin C) 500 mg tablet Take 1,000 mg by mouth daily. Provider, Historical   multivitamin (ONE A DAY) tablet Take 1 Tablet by mouth daily. Provider, Historical   pyridoxine, vitamin B6, (Vitamin B-6) 100 mg tablet Take 100 mg by mouth. Monday AND FRIDAY    Provider, Historical   cyanocobalamin (Vitamin B-12) 1,000 mcg tablet Take 2,000 mcg by mouth. Monday AND FRIDAY    Provider, Historical     Allergies   Allergen Reactions    Azithromycin Other (comments)     Patient has a.fib and it \"threw off\" his heart rhythm.  Erythromycin Rash    Pcn [Penicillins] Rash     AS CHILD. PT DOES NOT KNOW SEVERITY        Family History   Problem Relation Age of Onset   Timothy Safe Diabetes Mother     Dementia Mother     Hypertension Mother     Diabetes Father    Timothy Safe Hypertension Father     Diabetes Sister     Diabetes Brother     Kidney Disease Brother     Heart Disease Maternal Grandfather     Diabetes Sister     Diabetes Sister     Anesth Problems Neg Hx       Social History:  reports that he has never smoked.  He has never used smokeless tobacco. He reports previous alcohol use. He reports that he does not use drugs. Family and social history were personally reviewed, all pertinent and relevant details are outlined as above. Objective:     Visit Vitals  /75 (BP 1 Location: Left upper arm, BP Patient Position: At rest)   Pulse 72   Temp 98.6 °F (37 °C)   Resp 14   Ht 6' 3\" (1.905 m)   Wt 111 kg (244 lb 11.4 oz)   SpO2 98%   BMI 30.59 kg/m²      O2 Device: None (Room air)    Patient Vitals for the past 24 hrs:   Temp Pulse Resp BP SpO2   03/15/22 1730 98.6 °F (37 °C) 72 14 123/75 98 %   03/15/22 1630 -- 69 14 110/67 97 %   03/15/22 1600 -- 67 24 116/89 97 %   03/15/22 1530 -- 69 20 110/63 95 %   03/15/22 1500 -- 75 17 106/73 94 %   03/15/22 1430 -- 73 17 109/66 90 %   03/15/22 1400 -- 74 17 105/67 91 %   03/15/22 1330 -- 76 21 107/71 93 %   03/15/22 1315 -- 75 17 105/68 93 %   03/15/22 1245 -- 75 24 114/68 92 %   03/15/22 1230 -- 74 17 102/66 93 %   03/15/22 1220 98.4 °F (36.9 °C) 72 18 105/65 96 %   03/15/22 1014 98.2 °F (36.8 °C) 82 22 110/69 94 %     PHYSICAL EXAM:   General: Alert x oriented x 3, awake, no acute distress, resting in bed, pleasant and cooperative on exam  HEENT: PEERL, EOMI, moist mucus membranes  Neck: Supple, no JVD, no meningeal signs  Chest: Clear to auscultation bilaterally   CVS: RRR,  no murmurs/rubs/gallops  Abd: Soft, mild-tenderness, minimally distended, +bowel sounds   Ext: No clubbing, no cyanosis, no edema  Neuro/Psych: Pleasant mood and affect, CN 2-12 grossly intact, sensory grossly within normal limit, Strength 5/5 in all extremities,   Cap refill: Brisk, less than 3 seconds  Pulses: 2+, symmetric in all extremities  Skin: Warm, dry, without rashes or lesions    Data Review: All diagnostic labs and studies have been reviewed.     Abnormal Labs Reviewed   CBC WITH AUTOMATED DIFF - Abnormal; Notable for the following components:       Result Value    WBC 12.5 (*)     RDW 15.2 (*)     NEUTROPHILS 87 (*)     LYMPHOCYTES 5 (*) IMMATURE GRANULOCYTES 1 (*)     ABS. NEUTROPHILS 11.0 (*)     ABS. LYMPHOCYTES 0.6 (*)     ABS. IMM. GRANS. 0.1 (*)     All other components within normal limits   METABOLIC PANEL, COMPREHENSIVE - Abnormal; Notable for the following components:    Glucose 140 (*)     Globulin 4.3 (*)     A-G Ratio 0.9 (*)     All other components within normal limits   LACTIC ACID - Abnormal; Notable for the following components:    Lactic acid 2.2 (*)     All other components within normal limits   URINALYSIS W/MICROSCOPIC - Abnormal; Notable for the following components:    Appearance CLOUDY (*)     Protein 100 (*)     Glucose >1,000 (*)     Blood LARGE (*)     Nitrites Positive (*)     Leukocyte Esterase SMALL (*)     RBC >100 (*)     Bacteria 2+ (*)     All other components within normal limits       All Micro Results     Procedure Component Value Units Date/Time    CULTURE, URINE [[id number]] Collected: 03/15/22 1027    Order Status: Completed Specimen: Urine from Clean catch Updated: 03/15/22 1625    CULTURE, BLOOD, PAIRED [[id number]] Collected: 03/15/22 1027    Order Status: Completed Specimen: Blood Updated: 03/15/22 1419    CULTURE, URINE [[id number]]     Order Status: Canceled Specimen: Urine from [address] [place] [address] [[id number]] Collected: 03/15/22 1027    Order Status: Completed Specimen: Urine from Serum Updated: 03/15/22 1059     Urine culture hold       Urine on hold in Microbiology dept for 2 days. If unpreserved urine is submitted, it cannot be used for addtional testing after 24 hours, recollection will be required. IMAGING:   XR CHEST PORT   Final Result   No evidence of acute cardiopulmonary process.          CT ABD PELV W CONT    (Results Pending)        ECG/ECHO:    Results for orders placed or performed during the hospital encounter of 03/15/22   EKG, 12 LEAD, INITIAL   Result Value Ref Range    Ventricular Rate 81 BPM    Atrial Rate 81 BPM    P-R Interval 164 ms    QRS Duration 78 ms    Q-T Interval 368 ms    QTC Calculation (Bezet) 427 ms    Calculated P Axis 75 degrees    Calculated R Axis 74 degrees    Calculated T Axis 56 degrees    Diagnosis       Normal sinus rhythm  Septal infarct , age undetermined  Abnormal ECG  When compared with ECG of 02-JUL-2007 13:47,  Previous ECG has undetermined rhythm, needs review  Septal infarct is now present  Nonspecific T wave abnormality now evident in Anterior leads  Confirmed by Aniya Sy ([id number]) on 3/15/2022 12:55:27 PM          Assessment:   Given the patient's current clinical presentation, there is a high level of concern for decompensation if discharged from the emergency department. Complex decision making was performed, which includes reviewing the patient's available past medical records, laboratory results, and imaging studies. Active Problems:    * No active hospital problems. *    Plan:     1- fevers- due to UTI/ cystitis- cont IV abx- follow cultures, tylenol for fevers  CT abd/pelvis ordered  2- CV- hx of HTN and afib- resume home med sotalol and eliquis  3- DM- hold home po meds, accuchecks and ss insulin  4. HLD- resume home meds in am  5. Insomnia- resume home med regimen  6. Gout- resume home meds on DC        DIET: DIET ONE TIME MESSAGE   ISOLATION PRECAUTIONS: There are currently no Active Isolations  CODE STATUS: No Order   DVT PROPHYLAXIS: Eliquis  FUNCTIONAL STATUS PRIOR TO HOSPITALIZATION: Fully active and ambulatory; able to carry on all self-care without restriction. EARLY MOBILITY ASSESSMENT: Recommend routine ambulation while hospitalized with the assistance of nursing staff  ANTICIPATED DISCHARGE: 24-48 hours.   EMERGENCY CONTACT/SURROGATE DECISION MAKER: wife    Signed By: Jaciel Sam MD     March 15, 2022

## 2022-03-16 LAB
ANION GAP SERPL CALC-SCNC: 5 MMOL/L (ref 5–15)
BUN SERPL-MCNC: 13 MG/DL (ref 6–20)
BUN/CREAT SERPL: 13 (ref 12–20)
CALCIUM SERPL-MCNC: 8.4 MG/DL (ref 8.5–10.1)
CHLORIDE SERPL-SCNC: 110 MMOL/L (ref 97–108)
CO2 SERPL-SCNC: 24 MMOL/L (ref 21–32)
CREAT SERPL-MCNC: 0.99 MG/DL (ref 0.7–1.3)
ERYTHROCYTE [DISTWIDTH] IN BLOOD BY AUTOMATED COUNT: 15 % (ref 11.5–14.5)
EST. AVERAGE GLUCOSE BLD GHB EST-MCNC: 123 MG/DL
GLUCOSE BLD STRIP.AUTO-MCNC: 120 MG/DL (ref 65–117)
GLUCOSE BLD STRIP.AUTO-MCNC: 92 MG/DL (ref 65–117)
GLUCOSE BLD STRIP.AUTO-MCNC: 95 MG/DL (ref 65–117)
GLUCOSE SERPL-MCNC: 93 MG/DL (ref 65–100)
HBA1C MFR BLD: 5.9 % (ref 4–5.6)
HCT VFR BLD AUTO: 40.2 % (ref 36.6–50.3)
HGB BLD-MCNC: 13 G/DL (ref 12.1–17)
MCH RBC QN AUTO: 28.5 PG (ref 26–34)
MCHC RBC AUTO-ENTMCNC: 32.3 G/DL (ref 30–36.5)
MCV RBC AUTO: 88.2 FL (ref 80–99)
NRBC # BLD: 0 K/UL (ref 0–0.01)
NRBC BLD-RTO: 0 PER 100 WBC
PLATELET # BLD AUTO: 223 K/UL (ref 150–400)
PMV BLD AUTO: 10.3 FL (ref 8.9–12.9)
POTASSIUM SERPL-SCNC: 3.6 MMOL/L (ref 3.5–5.1)
RBC # BLD AUTO: 4.56 M/UL (ref 4.1–5.7)
SERVICE CMNT-IMP: ABNORMAL
SERVICE CMNT-IMP: NORMAL
SERVICE CMNT-IMP: NORMAL
SODIUM SERPL-SCNC: 139 MMOL/L (ref 136–145)
WBC # BLD AUTO: 13.1 K/UL (ref 4.1–11.1)

## 2022-03-16 PROCEDURE — 80048 BASIC METABOLIC PNL TOTAL CA: CPT

## 2022-03-16 PROCEDURE — 74011250637 HC RX REV CODE- 250/637: Performed by: FAMILY MEDICINE

## 2022-03-16 PROCEDURE — 65660000000 HC RM CCU STEPDOWN

## 2022-03-16 PROCEDURE — 82962 GLUCOSE BLOOD TEST: CPT

## 2022-03-16 PROCEDURE — 36415 COLL VENOUS BLD VENIPUNCTURE: CPT

## 2022-03-16 PROCEDURE — 83036 HEMOGLOBIN GLYCOSYLATED A1C: CPT

## 2022-03-16 PROCEDURE — 85027 COMPLETE CBC AUTOMATED: CPT

## 2022-03-16 PROCEDURE — 74011250636 HC RX REV CODE- 250/636: Performed by: FAMILY MEDICINE

## 2022-03-16 PROCEDURE — 74011000250 HC RX REV CODE- 250: Performed by: FAMILY MEDICINE

## 2022-03-16 RX ORDER — THERA TABS 400 MCG
1 TAB ORAL DAILY
Status: DISCONTINUED | OUTPATIENT
Start: 2022-03-16 | End: 2022-03-17 | Stop reason: HOSPADM

## 2022-03-16 RX ORDER — ASCORBIC ACID 500 MG
1000 TABLET ORAL DAILY
Status: DISCONTINUED | OUTPATIENT
Start: 2022-03-16 | End: 2022-03-17 | Stop reason: HOSPADM

## 2022-03-16 RX ADMIN — TEMAZEPAM 15 MG: 15 CAPSULE ORAL at 22:04

## 2022-03-16 RX ADMIN — WATER 1 G: 1 INJECTION INTRAMUSCULAR; INTRAVENOUS; SUBCUTANEOUS at 18:32

## 2022-03-16 RX ADMIN — SOTALOL HYDROCHLORIDE 120 MG: 80 TABLET ORAL at 08:53

## 2022-03-16 RX ADMIN — OXYCODONE HYDROCHLORIDE AND ACETAMINOPHEN 1000 MG: 500 TABLET ORAL at 15:37

## 2022-03-16 RX ADMIN — SENNOSIDES AND DOCUSATE SODIUM 2 TABLET: 50; 8.6 TABLET ORAL at 08:54

## 2022-03-16 RX ADMIN — SODIUM CHLORIDE, PRESERVATIVE FREE 10 ML: 5 INJECTION INTRAVENOUS at 06:20

## 2022-03-16 RX ADMIN — SODIUM CHLORIDE 75 ML/HR: 9 INJECTION, SOLUTION INTRAVENOUS at 22:07

## 2022-03-16 RX ADMIN — SODIUM CHLORIDE, PRESERVATIVE FREE 10 ML: 5 INJECTION INTRAVENOUS at 15:37

## 2022-03-16 RX ADMIN — SODIUM CHLORIDE, PRESERVATIVE FREE 10 ML: 5 INJECTION INTRAVENOUS at 22:04

## 2022-03-16 RX ADMIN — THERA TABS 1 TABLET: TAB at 15:37

## 2022-03-16 RX ADMIN — APIXABAN 5 MG: 5 TABLET, FILM COATED ORAL at 08:53

## 2022-03-16 RX ADMIN — TRAZODONE HYDROCHLORIDE 100 MG: 100 TABLET ORAL at 22:04

## 2022-03-16 RX ADMIN — APIXABAN 5 MG: 5 TABLET, FILM COATED ORAL at 18:32

## 2022-03-16 RX ADMIN — SOTALOL HYDROCHLORIDE 120 MG: 80 TABLET ORAL at 18:31

## 2022-03-16 RX ADMIN — SENNOSIDES AND DOCUSATE SODIUM 2 TABLET: 50; 8.6 TABLET ORAL at 18:31

## 2022-03-16 RX ADMIN — SODIUM CHLORIDE 75 ML/HR: 9 INJECTION, SOLUTION INTRAVENOUS at 08:58

## 2022-03-16 NOTE — PROGRESS NOTES
CPAP set up at 8cm H20. Patient uses home CPAP. Does not have home unit with him. Does not know his home settings.

## 2022-03-16 NOTE — PROGRESS NOTES
Patient: Edris Najjar MRN: [id number]  SSN: [id number]    YOB: 1955  Age: 77 y.o. Sex: male        ADMITTED: 3/15/2022 to Patrizia Landa MD by Alhaji Petty MD for UTI (urinary tract infection) [N39.0]    F/U for sepsis s/p TURP. He is feeling better. Denies further fevers or chills, no flank pain. Lower midline abdominal pain resolved. Dysuria improved. Voiding clear dark yellow UA. Tmax 101.6 overnight, now 99.1 F. HR wnl. BP stable. WBC 13.1. Lactic acid improved to 1.4. Hgb 13. Cr 0.99. UA + nitrites, small leuks,  WBC, >100 RBC, 2+ bacteria. Urine and blood cultures pending. +Rocephin       Vitals: Temp (24hrs), Av.9 °F (37.2 °C), Min:98.1 °F (36.7 °C), Max:101.6 °F (38.7 °C)    Blood pressure 111/67, pulse 69, temperature 99.1 °F (37.3 °C), resp. rate 18, height 6' 3\" (1.905 m), weight 111 kg (244 lb 11.4 oz), SpO2 92 %. Intake and Output:   1901 -  0700  In: 220 [P.O.:220]  Out: 700 [Urine:700]  No intake/output data recorded. SHAY Output lats 24 hrs: No data found. SHAY Output last 8 hrs: No data found. BM over last 24 hrs: No data found.     Physical Exam  General: NAD, pleasant  Respiratory: no distress, breathing easily, room air  Abdomen: soft, no distention; non-tender to palpation  : no CVA tenderness, voiding independently, yellow UA  Neuro: Appropriate, no focal neurological deficits  Skin: warm, dry  Extremities: moves all, full ROM    Labs:  CBC:   Lab Results   Component Value Date/Time    WBC 13.1 (H) 2022 12:42 AM    HCT 40.2 2022 12:42 AM    PLATELET 661  12:42 AM     BMP:   Lab Results   Component Value Date/Time    Glucose 93 2022 12:42 AM    Sodium 139 2022 12:42 AM    Potassium 3.6 2022 12:42 AM    Chloride 110 (H) 2022 12:42 AM    CO2 24 2022 12:42 AM    BUN 13 2022 12:42 AM    Creatinine 0.99 2022 12:42 AM    Calcium 8.4 (L) 2022 12:42 AM       Assessment/Plan: · Sepsis  · Hx of BPH s/p TURP 3/11/22     - Still having some fevers. WBC up mildly. UA consistent with infection. Do not expect culture to grow bacteria as he was treated with abx PTA. Continue empiric tx. Trend fevers and WBC.  Home when fevers resolved with full course of PO abx.      Pt seen with Dr. Saad Young By: Sherif Shelton, DANIELE - March 16, 2022

## 2022-03-16 NOTE — PROGRESS NOTES
KARLA: anticipate d/c home with spouse; Follow up with PCP & Specialist; Spouse transport    RUR: 7%  Reason for Admission:    Fevers, UTI/cystitis, s/p TURP 3/11/22                   RUR Score:     7%                Plan for utilizing home health:   TBD, no HH needs anticipated at this time       PCP: First and Last name:  Stephany Bui MD     Name of Practice:    Are you a current patient: Yes/No: YES Approximate date of last visit: three weeks ago   Can you participate in a virtual visit with your PCP: yes                    Current Advanced Directive/Advance Care Plan: Full Code      Healthcare Decision Maker:   Click here to complete [address] including selection of the [address] Relationship (ie \"Primary\")           Spouse, Tank Mario, [phone]                  Transition of Care Plan:                    [facility]-CM reviewed pt chart & met with pt at bedside to discuss transitional planning. Pt resides with spouse in a two story home. Prior to admission, pt stated he was independent with adls/iadls, dme is CPAP & glucometer. Pt uses [facility] for meds, however he stated that sometimes his Eliquis is very expensive, CM provided an Eliquis coupon card. CM confirmed pcp, demographics and health insurance. Pt noted that he also has Medicare A as his secondary policy, CM provided insurance info to Admissions to update chart record. Spouse transport. CM to follow for any transitional care needs. Nelson Page RN BSN CCM Medicare pt has received, reviewed, and signed 1st IM letter informing them of their right to appeal the discharge. Signed copy has been placed on pt bedside chart. Care Management Interventions  PCP Verified by CM: Yes  Palliative Care Criteria Met (RRAT>21 & CHF Dx)?: No  Mode of Transport at Discharge:  Other (see comment) (Spouse )  Transition of Care Consult (CM Consult): Discharge Planning  MyChart Signup: No (PT OWNS A CPAP MACHINE & GLUCOMETER)  Discharge Durable Medical Equipment: No  Health Maintenance Reviewed: Yes  Physical Therapy Consult: No  Occupational Therapy Consult: No  Support Systems: Spouse/Significant Other  Confirm Follow Up Transport: Family  Discharge Location  Patient Expects to be Discharged to[de-identified] Home  Readmission Assessment  Number of days since last admission?: 1-7 days  Previous disposition: Home with Family  Who is being interviewed?: Patient  What was the patient's/caregiver's perception as to why they think they needed to return back to the hospital?: Other (Comment) (pt stated he developed fevers following a recent urological procdure(TURP) 3/11)  Who advised the patient to return to the hospital?: Physician  Does the patient report anything that got in the way of taking their medications?: No  In our efforts to provide the best possible care to you and others like you, can you think of anything that we could have done to help you after you left the hospital the first time, so that you might not have needed to return so soon?: Discharge instructions that are concise, clear, and non contradictory

## 2022-03-17 VITALS
RESPIRATION RATE: 16 BRPM | WEIGHT: 250.88 LBS | HEIGHT: 75 IN | TEMPERATURE: 97.8 F | HEART RATE: 73 BPM | DIASTOLIC BLOOD PRESSURE: 79 MMHG | SYSTOLIC BLOOD PRESSURE: 121 MMHG | OXYGEN SATURATION: 92 % | BODY MASS INDEX: 31.19 KG/M2

## 2022-03-17 PROBLEM — N39.0 UTI (URINARY TRACT INFECTION): Status: RESOLVED | Noted: 2022-03-15 | Resolved: 2022-03-17

## 2022-03-17 LAB
ANION GAP SERPL CALC-SCNC: 5 MMOL/L (ref 5–15)
BACTERIA SPEC CULT: ABNORMAL
BUN SERPL-MCNC: 15 MG/DL (ref 6–20)
BUN/CREAT SERPL: 16 (ref 12–20)
CALCIUM SERPL-MCNC: 8.6 MG/DL (ref 8.5–10.1)
CC UR VC: ABNORMAL
CHLORIDE SERPL-SCNC: 110 MMOL/L (ref 97–108)
CO2 SERPL-SCNC: 23 MMOL/L (ref 21–32)
CREAT SERPL-MCNC: 0.91 MG/DL (ref 0.7–1.3)
ERYTHROCYTE [DISTWIDTH] IN BLOOD BY AUTOMATED COUNT: 15.4 % (ref 11.5–14.5)
GLUCOSE SERPL-MCNC: 81 MG/DL (ref 65–100)
HCT VFR BLD AUTO: 40.3 % (ref 36.6–50.3)
HGB BLD-MCNC: 12.9 G/DL (ref 12.1–17)
MAGNESIUM SERPL-MCNC: 2.2 MG/DL (ref 1.6–2.4)
MCH RBC QN AUTO: 28.5 PG (ref 26–34)
MCHC RBC AUTO-ENTMCNC: 32 G/DL (ref 30–36.5)
MCV RBC AUTO: 89 FL (ref 80–99)
NRBC # BLD: 0 K/UL (ref 0–0.01)
NRBC BLD-RTO: 0 PER 100 WBC
PLATELET # BLD AUTO: 232 K/UL (ref 150–400)
PMV BLD AUTO: 10.2 FL (ref 8.9–12.9)
POTASSIUM SERPL-SCNC: 4 MMOL/L (ref 3.5–5.1)
RBC # BLD AUTO: 4.53 M/UL (ref 4.1–5.7)
SERVICE CMNT-IMP: ABNORMAL
SODIUM SERPL-SCNC: 138 MMOL/L (ref 136–145)
WBC # BLD AUTO: 9.8 K/UL (ref 4.1–11.1)

## 2022-03-17 PROCEDURE — 36415 COLL VENOUS BLD VENIPUNCTURE: CPT

## 2022-03-17 PROCEDURE — 74011250637 HC RX REV CODE- 250/637: Performed by: FAMILY MEDICINE

## 2022-03-17 PROCEDURE — 74011000250 HC RX REV CODE- 250: Performed by: FAMILY MEDICINE

## 2022-03-17 PROCEDURE — 83735 ASSAY OF MAGNESIUM: CPT

## 2022-03-17 PROCEDURE — 74011250636 HC RX REV CODE- 250/636: Performed by: HOSPITALIST

## 2022-03-17 PROCEDURE — 80048 BASIC METABOLIC PNL TOTAL CA: CPT

## 2022-03-17 PROCEDURE — 85027 COMPLETE CBC AUTOMATED: CPT

## 2022-03-17 PROCEDURE — 74011000250 HC RX REV CODE- 250: Performed by: HOSPITALIST

## 2022-03-17 RX ORDER — CEPHALEXIN 500 MG/1
500 CAPSULE ORAL 4 TIMES DAILY
Qty: 28 CAPSULE | Refills: 0 | Status: SHIPPED | OUTPATIENT
Start: 2022-03-18 | End: 2022-03-25

## 2022-03-17 RX ADMIN — SENNOSIDES AND DOCUSATE SODIUM 2 TABLET: 50; 8.6 TABLET ORAL at 08:39

## 2022-03-17 RX ADMIN — APIXABAN 5 MG: 5 TABLET, FILM COATED ORAL at 08:38

## 2022-03-17 RX ADMIN — CEFTRIAXONE SODIUM 1 G: 1 INJECTION, POWDER, FOR SOLUTION INTRAMUSCULAR; INTRAVENOUS at 12:59

## 2022-03-17 RX ADMIN — SODIUM CHLORIDE, PRESERVATIVE FREE 10 ML: 5 INJECTION INTRAVENOUS at 07:10

## 2022-03-17 RX ADMIN — OXYCODONE HYDROCHLORIDE AND ACETAMINOPHEN 1000 MG: 500 TABLET ORAL at 08:39

## 2022-03-17 RX ADMIN — THERA TABS 1 TABLET: TAB at 08:39

## 2022-03-17 RX ADMIN — SOTALOL HYDROCHLORIDE 120 MG: 80 TABLET ORAL at 08:38

## 2022-03-17 NOTE — DISCHARGE SUMMARY
Discharge Summary     Patient: Geraldo Peters MRN: [id number]  SSN: [id number]    YOB: 1955  Age: 77 y.o. Sex: male       Admit Date: 3/15/2022    Discharge Date: 3/17/2022      Admission Diagnoses: UTI (urinary tract infection) [N39.0]    Discharge Diagnoses:   Problem List as of 3/17/2022 Never Reviewed          Codes Class Noted - Resolved    RESOLVED: UTI (urinary tract infection) ICD-10-CM: N39.0  ICD-9-CM: 599.0  3/15/2022 - 3/17/2022               Discharge Condition: Stable    Hospital Course: 77 y.o man who presented with fever after a cystoscopy with TURP. He was found to have a UTI. Cultures grew klebsiella pneumoniae. He was treated with IV ceftriaxone and changed to cephalexin. History of afib  HTN  Type 2 DM  Hyperlipidemia  History of gout    Consults: Urology    Significant Diagnostic Studies: see above  CT ABD PELV W CONT    Result Date: 3/15/2022  1. No evidence of abdominal or pelvic abscess. 2. Air-fluid level in the bladder, likely due to recent instrumentation. Diffuse bladder wall thickening is nonspecific, and may represent cystitis or postsurgical changes. 3. Cholelithiasis. No biliary ductal dilatation. XR CHEST PORT    Result Date: 3/15/2022  No evidence of acute cardiopulmonary process. Disposition: home    S: feels well, has some urinary urgency but otherwise improved, no fever, n/v/d, dyspnea    O:   Visit Vitals  /79 (BP 1 Location: Left upper arm, BP Patient Position: At rest)   Pulse 61   Temp 97.8 °F (36.6 °C)   Resp 16   Ht 6' 3\" (1.905 m)   Wt 113.8 kg (250 lb 14.1 oz)   SpO2 92%   BMI 31.36 kg/m²     NAD  Heart RRR  Lungs CTAB  Abd soft NT ND      Discharge Medications:   Current Discharge Medication List      START taking these medications    Details   cephALEXin (KEFLEX) 500 mg capsule Take 1 Capsule by mouth four (4) times daily for 7 days.   Qty: 28 Capsule, Refills: 0         CONTINUE these medications which have NOT CHANGED    Details dulaglutide (Trulicity) 6.27 QX/6.6 mL sub-q pen 0.75 mg by SubCUTAneous route every seven (7) days. PT TAKES ON SUNDAY      metFORMIN ER (GLUCOPHAGE XR) 750 mg tablet Take 750 mg by mouth two (2) times a day. sotaloL (BETAPACE) 120 mg tablet Take 120 mg by mouth two (2) times a day. allopurinoL (ZYLOPRIM) 300 mg tablet Take 300 mg by mouth nightly. simvastatin (ZOCOR) 40 mg tablet Take 40 mg by mouth nightly. apixaban (Eliquis) 5 mg tablet Take 5 mg by mouth two (2) times a day. empagliflozin (Jardiance) 25 mg tablet Take 25 mg by mouth Every morning. traZODone (DESYREL) 50 mg tablet Take 100 mg by mouth nightly. temazepam (RESTORIL) 15 mg capsule Take 15 mg by mouth nightly. ascorbic acid, vitamin C, (Vitamin C) 500 mg tablet Take 1,000 mg by mouth daily. multivitamin (ONE A DAY) tablet Take 1 Tablet by mouth daily. pyridoxine, vitamin B6, (Vitamin B-6) 100 mg tablet Take 100 mg by mouth. Monday AND FRIDAY      cyanocobalamin (Vitamin B-12) 1,000 mcg tablet Take 2,000 mcg by mouth. Monday AND FRIDAY           Follow-up Information     Follow up With Specialties Details Why Thi Spencer MD [facility]   [address] [place] [address]. [address] [address] [id number]  [phone]      Rock Encinas MD Urology Schedule an appointment as soon as possible for a visit  [facility]. [address]            Signed By: Fatou Ulloa MD     March 17, 2022      Greater than 30 minutes spent on discharge management.

## 2022-03-17 NOTE — DISCHARGE INSTRUCTIONS
Discharge Instructions       PATIENT ID: Reyes Ayoub  MRN: [id number]   YOB: 1955    DATE OF ADMISSION: 3/15/2022 12:09 PM    DATE OF DISCHARGE: 3/17/2022    PRIMARY CARE PROVIDER: Alfie Caba MD     ATTENDING PHYSICIAN: Jessica Hahn MD  DISCHARGING PROVIDER: Andra Bishop MD    To contact this individual call [phone] and ask the  to page. If unavailable ask to be transferred the Adult Hospitalist Department. DISCHARGE DIAGNOSES urinary tract infection    CONSULTATIONS: IP CONSULT TO UROLOGY  IP CONSULT TO UROLOGY    PROCEDURES/SURGERIES: * No surgery found *    PENDING TEST RESULTS:   At the time of discharge the following test results are still pending: finalization of blood cultures    FOLLOW UP APPOINTMENTS:   Follow-up Information     Follow up With Specialties Details Why Contact Info    Alfie Caba MD Family Medicine   [address] [place] [id number] [phone]      Farida Otto MD Urology Schedule an appointment as soon as possible for a visit  [address] [facility] [phone]             ADDITIONAL CARE RECOMMENDATIONS: you were admitted for a urinary tract infection. A new antibiotic has been prescribed which is effective for the current bacteria. Follow-up with your doctors. DIET: Diabetic Diet    ACTIVITY: Activity as tolerated      DISCHARGE MEDICATIONS:   See Medication Reconciliation Form    · It is important that you take the medication exactly as they are prescribed. · Keep your medication in the bottles provided by the pharmacist and keep a list of the medication names, dosages, and times to be taken in your wallet. · Do not take other medications without consulting your doctor. NOTIFY YOUR PHYSICIAN FOR ANY OF THE FOLLOWING:   Fever over 101 degrees for 24 hours.    Chest pain, shortness of breath, fever, chills, nausea, vomiting, diarrhea, change in mentation, falling, weakness, bleeding. Severe pain or pain not relieved by medications. Or, any other signs or symptoms that you may have questions about.       DISPOSITION:  x  Home With:   OT  PT  HH  RN       SNF/Inpatient Rehab/LTAC    Independent/assisted living    Hospice    Other:       Signed:   Marva Medrano MD  3/17/2022  11:11 AM

## 2022-03-17 NOTE — PROGRESS NOTES
Hospital follow-up PCP transitional care appointment has been scheduled with Dr. Trinidad Gibson for Thursday, 3/24/22 at 11:15 a.m. Pending patient discharge. Migdalia Cervantes, Care Management Assistant.

## 2022-03-17 NOTE — PROGRESS NOTES
Spiritual Care Assessment/Progress Note  [facility]      NAME: Geraldo Peters      MRN: [id number]  AGE: 77 y.o.  SEX: male  Buddhism Affiliation: Faith   Language: English     3/17/2022     Total Time (in minutes): 25     Spiritual Assessment begun in [facility] through conversation with:         [x]Patient        [x] Family    [] Friend(s)        Reason for Consult: Initial/Spiritual assessment, patient floor     Spiritual beliefs: (Please include comment if needed)     [x] Identifies with a tori tradition:         [x] Supported by a tori community: [employer]           [] Claims no spiritual orientation:           [] Seeking spiritual identity:                [] Adheres to an individual form of spirituality:           [] Not able to assess:                           Identified resources for coping:      [x] Prayer                               [] Music                  [] Guided Imagery     [x] Family/friends                 [] Pet visits     [] Devotional reading                         [] Unknown     [] Other                                           Interventions offered during this visit: (See comments for more details)    Patient Interventions: Affirmation of tori,Initial/Spiritual assessment, patient floor,Iconic (affirming the presence of God/Higher Power),Prayer (assurance of),Buddhism beliefs/image of God discussed     Family/Friend(s): Iconic (affirming the presence of God/Higher Power),Prayer (assurance of),Buddhism beliefs/image of God discussed     Plan of Care:     [] Support spiritual and/or cultural needs    [] Support AMD and/or advance care planning process      [] Support grieving process   [] Coordinate Rites and/or Rituals    [] Coordination with community clergy   [] No spiritual needs identified at this time   [] Detailed Plan of Care below (See Comments)  [] Make referral to Music Therapy  [] Make referral to Pet Therapy     [] Make referral to Addiction services  [] Make referral to [facility]  [] Make referral to Spiritual Care Partner  [] No future visits requested        [x] Contact Spiritual Care for further referrals     Comments: Kyle Guardian Mr. Lorna Gibson with Spiritual Care Partner volunteer in training. Pt shared a reason for his hospitalization and his encouragement as to how he is feeling. He shared of his [employer] tori and connection to his Confucianist. His wife arrived during our visit and joined in conversation regarding their tori, their Confucianist, and their family. No additional needs expressed by pt or wife at this time. Offered assurance of continued prayers.     Taqueria Downing , [facility]  [phone]

## 2022-03-17 NOTE — PROGRESS NOTES
[address] Adult  Hospitalist Group                                                                                          Hospitalist Progress Note  Toribio Castillo MD  Answering service: [phone] from in house phone      NAME:  Tyrone Turcios  :  1955  MRN:  [id number]      Admission Summary:   Tyrone Turcios is a 77 y.o. male who presents with fevers. He arrived from the urologist office for fever to 102 and chills from earlier today. He had cystoscopy on 3/11/22 with  Transurethral prostate resection. [facility] is having some increased pain in the lower abdomen.  Earlier he had had some dysuria.  He has also been having hematuria. [facility] does have a history of BPH with urinary obstruction.  He was given an injection of antibiotic at the urologist office and sent here for further evaluation.  The patient has had no nausea or vomiting.  He denies any bowel issues. [facility] has had no chest pain or shortness of breath.  Patient voices no other acute complaint.  Patient does have a history of a flutter and diabetes with hypertension. Interval history / Subjective:   Patient with fever overnight but some improvement in his symptoms  Urology following, follow urine culture, cont IVFLuids and IV abx     Assessment & Plan:     1- fevers- due to UTI/ cystitis- cont IV abx- follow cultures, tylenol for fevers  CT abd/pelvis reviewed- incidental finding of gallstone relayed to patient  2- CV- hx of HTN and afib- resumed home med sotalol and eliquis  3- DM- hold home po meds due to recent use of IV contrast, stable glucose levels  Stopped accuchecks and ss insulin, resume PO diabetes meds on DC  4. HLD- resume home meds on DC  5. Insomnia- resumed home med regimen  6.  Gout- resume home meds on DC     Code status: Full  Prophylaxis: [address] discussed with: patient  Anticipated Disposition: 1-2 days     Hospital Problems  Never Reviewed          Codes Class Noted POA    UTI (urinary tract infection) ICD-10-CM: N39.0  ICD-9-CM: 599.0  3/15/2022 Unknown                Review of Systems:   A comprehensive review of systems was negative except for that written in the HPI. Vital Signs:    Last 24hrs VS reviewed since prior progress note.  Most recent are:  Visit Vitals  /64 (BP 1 Location: Left upper arm, BP Patient Position: At rest)   Pulse (!) 55   Temp 99.4 °F (37.4 °C)   Resp 18   Ht 6' 3\" (1.905 m)   Wt 113.8 kg (250 lb 14.1 oz)   SpO2 95%   BMI 31.36 kg/m²     Patient Vitals for the past 24 hrs:   Temp Pulse Resp BP SpO2   03/17/22 0000 -- (!) 55 -- -- --   03/16/22 2008 99.4 °F (37.4 °C) (!) 58 18 103/64 95 %   03/16/22 1833 -- 62 -- -- --   03/16/22 1800 -- 62 -- -- --   03/16/22 1600 -- 60 -- -- --   03/16/22 1437 98.2 °F (36.8 °C) (!) 59 18 99/63 94 %   03/16/22 1200 -- 65 -- -- --   03/16/22 1000 -- (!) 59 -- -- --   03/16/22 0821 99.1 °F (37.3 °C) 69 18 111/67 92 %   03/16/22 0800 -- 71 -- -- --   03/16/22 0400 -- 69 -- -- --       Intake/Output Summary (Last 24 hours) at 3/17/2022 0050  Last data filed at 3/16/2022 1538  Gross per 24 hour   Intake 400 ml   Output 1000 ml   Net -600 ml        Physical Examination:     General: Alert x oriented x 3, awake, no acute distress, resting in bed, pleasant and cooperative on exam  HEENT: PEERL, EOMI, moist mucus membranes  Neck: Supple, no JVD, no meningeal signs  Chest: Clear to auscultation bilaterally   CVS: RRR,  no murmurs/rubs/gallops  Abd: Soft, mild-tenderness, minimally distended, +bowel sounds   Ext: No clubbing, no cyanosis, no edema  Neuro/Psych: Pleasant mood and affect, CN 2-12 grossly intact, sensory grossly within normal limit, Strength 5/5 in all extremities,            Data Review:    Review and/or order of clinical lab test  Review and/or order of tests in the radiology section of CPT  Review and/or order of tests in the medicine section of CPT  CT Results  (Last 48 hours)               03/15/22 6605  CT ABD PELV W CONT Final result    Impression:      1. No evidence of abdominal or pelvic abscess. 2. Air-fluid level in the bladder, likely due to recent instrumentation. Diffuse   bladder wall thickening is nonspecific, and may represent cystitis or   postsurgical changes. 3. Cholelithiasis. No biliary ductal dilatation. Narrative:  EXAM: CT ABD PELV W CONT       INDICATION: rule out abscess ; postop chills; patient is status post cystoscopy   and TURP       COMPARISON: None        CONTRAST: 100 mL of Isovue-370. ORAL CONTRAST: Not given       TECHNIQUE:    Following the uneventful intravenous administration of contrast, thin axial   images were obtained through the abdomen and pelvis. Coronal and sagittal   reconstructions were generated. CT dose reduction was achieved through use of a   standardized protocol tailored for this examination and automatic exposure   control for dose modulation. FINDINGS:    LOWER THORAX: No significant abnormality in the incidentally imaged lower chest.   LIVER: No mass. BILIARY TREE: Gallbladder contains a single stone. CBD is not dilated. SPLEEN: Trace perisplenic ascites   PANCREAS: No mass or ductal dilatation. ADRENALS: Unremarkable. KIDNEYS: No mass, calculus, or hydronephrosis. STOMACH: Unremarkable. SMALL BOWEL: No dilatation or wall thickening. COLON: No dilatation or wall thickening. APPENDIX: Normal   PERITONEUM: No ascites or pneumoperitoneum. RETROPERITONEUM: No lymphadenopathy or aortic aneurysm. REPRODUCTIVE ORGANS: Prostate is noted. There is no evidence of pelvic abscess. URINARY BLADDER: Air-fluid level present, consistent with recent   instrumentation. Bladder wall thickening, nonspecific. BONES: No destructive bone lesion. ABDOMINAL WALL: No mass or hernia.    ADDITIONAL COMMENTS: Degenerative disc disease at L5-S1               Results     Procedure Component Value Units Date/Time    CULTURE, URINE [[id number]]     Order Status: Canceled Specimen: Urine from Clean catch     CULTURE, BLOOD, PAIRED [[id number]] Collected: 03/15/22 1027    Order Status: Completed Specimen: Blood Updated: 03/16/22 0508     Special Requests: NO SPECIAL REQUESTS        Culture result: NO GROWTH AFTER 14 HOURS       URINE CULTURE HOLD SAMPLE [[id number]] Collected: 03/15/22 1027    Order Status: Completed Specimen: Urine from Serum Updated: 03/15/22 1059     Urine culture hold       Urine on hold in Microbiology dept for 2 days. If unpreserved urine is submitted, it cannot be used for addtional testing after 24 hours, recollection will be required. CULTURE, URINE [[id number]]  (Abnormal) Collected: 03/15/22 1027    Order Status: Completed Specimen: Urine from Clean catch Updated: 03/16/22 1302     Special Requests: NO SPECIAL REQUESTS        Elim Count --        [id number]  COLONIES/mL       Culture result: GRAM NEGATIVE RODS             Labs:     Recent Labs     03/16/22  0042 03/15/22  1027   WBC 13.1* 12.5*   HGB 13.0 15.9   HCT 40.2 47.9    277     Recent Labs     03/16/22  0042 03/15/22  1027    136   K 3.6 3.6   * 106   CO2 24 24   BUN 13 15   CREA 0.99 1.21   GLU 93 140*   CA 8.4* 9.8     Recent Labs     03/15/22  1027   ALT 31   AP 75   TBILI 0.5   TP 8.1   ALB 3.8   GLOB 4.3*     No results for input(s): INR, PTP, APTT, INREXT in the last 72 hours. No results for input(s): FE, TIBC, PSAT, FERR in the last 72 hours. No results found for: FOL, RBCF   No results for input(s): PH, PCO2, PO2 in the last 72 hours. No results for input(s): CPK, CKNDX, TROIQ in the last 72 hours.     No lab exists for component: CPKMB  No results found for: CHOL, CHOLX, CHLST, CHOLV, HDL, HDLP, LDL, LDLC, DLDLP, TGLX, TRIGL, TRIGP, CHHD, CHHDX  Lab Results   Component Value Date/Time    Glucose (POC) 92 03/16/2022 04:14 PM    Glucose (POC) 120 (H) 03/16/2022 11:04 AM    Glucose (POC) 95 03/16/2022 06:36 AM    Glucose (POC) 102 03/15/2022 08:56 PM Glucose (POC) 107 03/12/2022 06:18 AM     Lab Results   Component Value Date/Time    Color YELLOW/STRAW 03/15/2022 10:27 AM    Appearance CLOUDY (A) 03/15/2022 10:27 AM    Specific gravity 1.029 03/15/2022 10:27 AM    pH (UA) 5.5 03/15/2022 10:27 AM    Protein 100 (A) 03/15/2022 10:27 AM    Glucose >1,000 (A) 03/15/2022 10:27 AM    Ketone Negative 03/15/2022 10:27 AM    Bilirubin Negative 03/15/2022 10:27 AM    Urobilinogen 0.2 03/15/2022 10:27 AM    Nitrites Positive (A) 03/15/2022 10:27 AM    Leukocyte Esterase SMALL (A) 03/15/2022 10:27 AM    Epithelial cells FEW 03/15/2022 10:27 AM    Bacteria 2+ (A) 03/15/2022 10:27 AM    WBC  03/15/2022 10:27 AM    RBC >100 (H) 03/15/2022 10:27 AM         Medications Reviewed:     Current Facility-Administered Medications   Medication Dose Route Frequency    therapeutic multivitamin (THERAGRAN) tablet 1 Tablet  1 Tablet Oral DAILY    ascorbic acid (vitamin C) (VITAMIN C) tablet 1,000 mg  1,000 mg Oral DAILY    sodium chloride (NS) flush 5-10 mL  5-10 mL IntraVENous PRN    sodium chloride (NS) flush 5-40 mL  5-40 mL IntraVENous Q8H    sodium chloride (NS) flush 5-40 mL  5-40 mL IntraVENous PRN    acetaminophen (TYLENOL) tablet 650 mg  650 mg Oral Q6H PRN    Or    acetaminophen (TYLENOL) suppository 650 mg  650 mg Rectal Q6H PRN    polyethylene glycol (MIRALAX) packet 17 g  17 g Oral DAILY PRN    ondansetron (ZOFRAN) injection 4 mg  4 mg IntraVENous Q6H PRN    cefTRIAXone (ROCEPHIN) 1 g in sterile water (preservative free) 10 mL IV syringe  1 g IntraVENous Q24H    0.9% sodium chloride infusion  75 mL/hr IntraVENous CONTINUOUS    senna-docusate (PERICOLACE) 8.6-50 mg per tablet 2 Tablet  2 Tablet Oral BID    apixaban (ELIQUIS) tablet 5 mg  5 mg Oral BID    sotaloL (BETAPACE) tablet 120 mg  120 mg Oral BID    temazepam (RESTORIL) capsule 15 mg  15 mg Oral QHS    traZODone (DESYREL) tablet 100 mg  100 mg Oral QHS ______________________________________________________________________  EXPECTED LENGTH OF STAY: 2d 21h  ACTUAL LENGTH OF STAY:          2                 Lenette Yulissa, MD

## 2022-03-20 LAB
BACTERIA SPEC CULT: NORMAL
SERVICE CMNT-IMP: NORMAL

## 2022-03-24 PROBLEM — N39.0 UTI (URINARY TRACT INFECTION): Status: ACTIVE | Noted: 2022-03-15

## 2022-03-24 PROBLEM — N39.0 UTI (URINARY TRACT INFECTION): Status: RESOLVED | Noted: 2022-03-15 | Resolved: 2022-03-17

## 2022-04-04 NOTE — PROGRESS NOTES
Physician Progress Note      Lisa Esparza  CSN #:                  [id number]  :                       1955  ADMIT DATE:       3/15/2022 12:09 PM  [address] [place] DATE:        3/17/2022 2:25 PM  RESPONDING  PROVIDER #:        GABE Chaudhary MD          QUERY TEXT:    Patient admitted with UTI. Noted documentation of Sepsis on Urology consult 3/16. In order to support the diagnosis of Sepsis, please include additional clinical indicators in your documentation. Or please document if the diagnosis of Sepsis has been ruled out after further study. The medical record reflects the following:  Risk Factors: 76 y/o male admitted with fever and UTI, s/p recent TURP 3/11/22, OP Abx treatment, pmh BPH  Clinical Indicators: increased abd pain, dysuria, hematuria, febrile T.102, , Lactic acid 2.2, UA + nitrites, small leuks,  WBC, >100 RBC, 2+ bacteria, per Urology consult 3/16 \"F/U Sepsis s/p TURP 3/11/22  Treatment: IVF bolus 0.9% NS 3.33L, Levaquin, Rocephin, Urology consulted, BC, UC,  Options provided:  -- Sepsis POA as evidenced by, Please document evidence. -- Sepsis not POA as evidenced by, Please document evidence. -- Sepsis was ruled out  -- Other - I will add my own diagnosis  -- Disagree - Not applicable / Not valid  -- Disagree - Clinically unable to determine / Unknown  -- Refer to Clinical Documentation Reviewer    PROVIDER RESPONSE TEXT:    This patient has Sepsis that was POA as evidenced by leukocytosis, tachycardia, fever, lactic acidosis    Query created by:  Hardik Bergeron on 3/16/2022 3:32 PM      Electronically signed by:  Maria Eugenia Figueroa MD 2022 1:41 PM

## 2022-09-07 PROCEDURE — 99285 EMERGENCY DEPT VISIT HI MDM: CPT

## 2022-09-07 PROCEDURE — 90791 PSYCH DIAGNOSTIC EVALUATION: CPT

## 2022-09-08 ENCOUNTER — HOSPITAL ENCOUNTER (EMERGENCY)
Age: 67
Discharge: OTHER INSTITUTION | End: 2022-09-08
Attending: STUDENT IN AN ORGANIZED HEALTH CARE EDUCATION/TRAINING PROGRAM
Payer: COMMERCIAL

## 2022-09-08 ENCOUNTER — HOSPITAL ENCOUNTER (INPATIENT)
Age: 67
LOS: 4 days | Discharge: ANOTHER HOSPITAL | DRG: 882 | End: 2022-09-12
Attending: PSYCHIATRY & NEUROLOGY | Admitting: PSYCHIATRY & NEUROLOGY
Payer: COMMERCIAL

## 2022-09-08 VITALS
TEMPERATURE: 98.1 F | RESPIRATION RATE: 15 BRPM | DIASTOLIC BLOOD PRESSURE: 69 MMHG | OXYGEN SATURATION: 97 % | HEART RATE: 65 BPM | SYSTOLIC BLOOD PRESSURE: 111 MMHG

## 2022-09-08 DIAGNOSIS — F32.A DEPRESSION, UNSPECIFIED DEPRESSION TYPE: Primary | ICD-10-CM

## 2022-09-08 LAB
ALBUMIN SERPL-MCNC: 4 G/DL (ref 3.5–5)
ALBUMIN/GLOB SERPL: 1 {RATIO} (ref 1.1–2.2)
ALP SERPL-CCNC: 77 U/L (ref 45–117)
ALT SERPL-CCNC: 24 U/L (ref 12–78)
AMPHET UR QL SCN: NEGATIVE
ANION GAP SERPL CALC-SCNC: 6 MMOL/L (ref 5–15)
APAP SERPL-MCNC: <2 UG/ML (ref 10–30)
APPEARANCE UR: CLEAR
AST SERPL-CCNC: 17 U/L (ref 15–37)
BACTERIA URNS QL MICRO: NEGATIVE /HPF
BARBITURATES UR QL SCN: NEGATIVE
BASOPHILS # BLD: 0.1 K/UL (ref 0–0.1)
BASOPHILS NFR BLD: 1 % (ref 0–1)
BENZODIAZ UR QL: NEGATIVE
BILIRUB SERPL-MCNC: 0.5 MG/DL (ref 0.2–1)
BILIRUB UR QL: NEGATIVE
BUN SERPL-MCNC: 15 MG/DL (ref 6–20)
BUN/CREAT SERPL: 13 (ref 12–20)
CALCIUM SERPL-MCNC: 9.4 MG/DL (ref 8.5–10.1)
CANNABINOIDS UR QL SCN: NEGATIVE
CHLORIDE SERPL-SCNC: 107 MMOL/L (ref 97–108)
CO2 SERPL-SCNC: 25 MMOL/L (ref 21–32)
COCAINE UR QL SCN: NEGATIVE
COLOR UR: ABNORMAL
CREAT SERPL-MCNC: 1.17 MG/DL (ref 0.7–1.3)
DIFFERENTIAL METHOD BLD: ABNORMAL
DRUG SCRN COMMENT,DRGCM: NORMAL
EOSINOPHIL # BLD: 2.6 K/UL (ref 0–0.4)
EOSINOPHIL NFR BLD: 20 % (ref 0–7)
EPITH CASTS URNS QL MICRO: ABNORMAL /LPF
ERYTHROCYTE [DISTWIDTH] IN BLOOD BY AUTOMATED COUNT: 15.9 % (ref 11.5–14.5)
ETHANOL SERPL-MCNC: <10 MG/DL
FLUAV RNA SPEC QL NAA+PROBE: NOT DETECTED
FLUBV RNA SPEC QL NAA+PROBE: NOT DETECTED
GLOBULIN SER CALC-MCNC: 4 G/DL (ref 2–4)
GLUCOSE SERPL-MCNC: 103 MG/DL (ref 65–100)
GLUCOSE UR STRIP.AUTO-MCNC: >1000 MG/DL
HCT VFR BLD AUTO: 48.1 % (ref 36.6–50.3)
HGB BLD-MCNC: 16.4 G/DL (ref 12.1–17)
HGB UR QL STRIP: NEGATIVE
HYALINE CASTS URNS QL MICRO: ABNORMAL /LPF (ref 0–5)
IMM GRANULOCYTES # BLD AUTO: 0.1 K/UL (ref 0–0.04)
IMM GRANULOCYTES NFR BLD AUTO: 1 % (ref 0–0.5)
KETONES UR QL STRIP.AUTO: NEGATIVE MG/DL
LEUKOCYTE ESTERASE UR QL STRIP.AUTO: NEGATIVE
LYMPHOCYTES # BLD: 3.1 K/UL (ref 0.8–3.5)
LYMPHOCYTES NFR BLD: 24 % (ref 12–49)
MCH RBC QN AUTO: 29.4 PG (ref 26–34)
MCHC RBC AUTO-ENTMCNC: 34.1 G/DL (ref 30–36.5)
MCV RBC AUTO: 86.4 FL (ref 80–99)
METHADONE UR QL: NEGATIVE
MONOCYTES # BLD: 1 K/UL (ref 0–1)
MONOCYTES NFR BLD: 8 % (ref 5–13)
NEUTS SEG # BLD: 6.2 K/UL (ref 1.8–8)
NEUTS SEG NFR BLD: 46 % (ref 32–75)
NITRITE UR QL STRIP.AUTO: NEGATIVE
NRBC # BLD: 0 K/UL (ref 0–0.01)
NRBC BLD-RTO: 0 PER 100 WBC
OPIATES UR QL: NEGATIVE
PCP UR QL: NEGATIVE
PH UR STRIP: 5.5 [PH] (ref 5–8)
PLATELET # BLD AUTO: 194 K/UL (ref 150–400)
PLATELET COMMENTS,PCOM: ABNORMAL
PMV BLD AUTO: 9.7 FL (ref 8.9–12.9)
POTASSIUM SERPL-SCNC: 3.8 MMOL/L (ref 3.5–5.1)
PROT SERPL-MCNC: 8 G/DL (ref 6.4–8.2)
PROT UR STRIP-MCNC: NEGATIVE MG/DL
RBC # BLD AUTO: 5.57 M/UL (ref 4.1–5.7)
RBC #/AREA URNS HPF: ABNORMAL /HPF (ref 0–5)
RBC MORPH BLD: ABNORMAL
SALICYLATES SERPL-MCNC: <1.7 MG/DL (ref 2.8–20)
SARS-COV-2, COV2: NOT DETECTED
SODIUM SERPL-SCNC: 138 MMOL/L (ref 136–145)
SP GR UR REFRACTOMETRY: 1.01 (ref 1–1.03)
UR CULT HOLD, URHOLD: NORMAL
UROBILINOGEN UR QL STRIP.AUTO: 0.2 EU/DL (ref 0.2–1)
WBC # BLD AUTO: 13.1 K/UL (ref 4.1–11.1)
WBC URNS QL MICRO: ABNORMAL /HPF (ref 0–4)

## 2022-09-08 PROCEDURE — 80307 DRUG TEST PRSMV CHEM ANLYZR: CPT

## 2022-09-08 PROCEDURE — 87636 SARSCOV2 & INF A&B AMP PRB: CPT

## 2022-09-08 PROCEDURE — 74011250637 HC RX REV CODE- 250/637: Performed by: STUDENT IN AN ORGANIZED HEALTH CARE EDUCATION/TRAINING PROGRAM

## 2022-09-08 PROCEDURE — 82077 ASSAY SPEC XCP UR&BREATH IA: CPT

## 2022-09-08 PROCEDURE — 80053 COMPREHEN METABOLIC PANEL: CPT

## 2022-09-08 PROCEDURE — 65220000003 HC RM SEMIPRIVATE PSYCH

## 2022-09-08 PROCEDURE — 36415 COLL VENOUS BLD VENIPUNCTURE: CPT

## 2022-09-08 PROCEDURE — 81001 URINALYSIS AUTO W/SCOPE: CPT

## 2022-09-08 PROCEDURE — 80179 DRUG ASSAY SALICYLATE: CPT

## 2022-09-08 PROCEDURE — 80143 DRUG ASSAY ACETAMINOPHEN: CPT

## 2022-09-08 PROCEDURE — 85025 COMPLETE CBC W/AUTO DIFF WBC: CPT

## 2022-09-08 PROCEDURE — 74011250637 HC RX REV CODE- 250/637

## 2022-09-08 RX ORDER — TRAZODONE HYDROCHLORIDE 50 MG/1
50 TABLET ORAL
Status: DISCONTINUED | OUTPATIENT
Start: 2022-09-08 | End: 2022-09-08 | Stop reason: HOSPADM

## 2022-09-08 RX ORDER — ACETAMINOPHEN 325 MG/1
650 TABLET ORAL
Status: DISCONTINUED | OUTPATIENT
Start: 2022-09-08 | End: 2022-09-12

## 2022-09-08 RX ORDER — BENZTROPINE MESYLATE 1 MG/1
1 TABLET ORAL
Status: DISCONTINUED | OUTPATIENT
Start: 2022-09-08 | End: 2022-09-08

## 2022-09-08 RX ORDER — TEMAZEPAM 15 MG/1
15 CAPSULE ORAL
Status: DISCONTINUED | OUTPATIENT
Start: 2022-09-08 | End: 2022-09-08 | Stop reason: HOSPADM

## 2022-09-08 RX ORDER — PANTOPRAZOLE SODIUM 40 MG/1
40 TABLET, DELAYED RELEASE ORAL
Status: DISCONTINUED | OUTPATIENT
Start: 2022-09-09 | End: 2022-09-12

## 2022-09-08 RX ORDER — OLANZAPINE 2.5 MG/1
2.5 TABLET ORAL
Status: DISCONTINUED | OUTPATIENT
Start: 2022-09-08 | End: 2022-09-12

## 2022-09-08 RX ORDER — COLCHICINE 0.6 MG/1
1.2 TABLET ORAL ONCE
Status: COMPLETED | OUTPATIENT
Start: 2022-09-08 | End: 2022-09-08

## 2022-09-08 RX ORDER — OLANZAPINE 5 MG/1
5 TABLET ORAL
Status: DISCONTINUED | OUTPATIENT
Start: 2022-09-08 | End: 2022-09-08

## 2022-09-08 RX ORDER — DIPHENHYDRAMINE HYDROCHLORIDE 50 MG/ML
25 INJECTION, SOLUTION INTRAMUSCULAR; INTRAVENOUS
Status: DISCONTINUED | OUTPATIENT
Start: 2022-09-08 | End: 2022-09-12

## 2022-09-08 RX ORDER — COLCHICINE 0.6 MG/1
0.6 TABLET ORAL ONCE
Status: ACTIVE | OUTPATIENT
Start: 2022-09-08 | End: 2022-09-08

## 2022-09-08 RX ORDER — LORAZEPAM 2 MG/ML
1 INJECTION INTRAMUSCULAR
Status: DISCONTINUED | OUTPATIENT
Start: 2022-09-08 | End: 2022-09-08

## 2022-09-08 RX ORDER — TRAZODONE HYDROCHLORIDE 100 MG/1
100 TABLET ORAL
Status: DISCONTINUED | OUTPATIENT
Start: 2022-09-08 | End: 2022-09-09

## 2022-09-08 RX ORDER — HYDROXYZINE 25 MG/1
50 TABLET, FILM COATED ORAL
Status: DISCONTINUED | OUTPATIENT
Start: 2022-09-08 | End: 2022-09-08

## 2022-09-08 RX ORDER — DIPHENHYDRAMINE HYDROCHLORIDE 50 MG/ML
50 INJECTION, SOLUTION INTRAMUSCULAR; INTRAVENOUS
Status: DISCONTINUED | OUTPATIENT
Start: 2022-09-08 | End: 2022-09-08

## 2022-09-08 RX ORDER — ATORVASTATIN CALCIUM 40 MG/1
40 TABLET, FILM COATED ORAL
Status: DISCONTINUED | OUTPATIENT
Start: 2022-09-08 | End: 2022-09-12

## 2022-09-08 RX ORDER — INDOMETHACIN 25 MG/1
50 CAPSULE ORAL
Status: DISCONTINUED | OUTPATIENT
Start: 2022-09-08 | End: 2022-09-09

## 2022-09-08 RX ORDER — ADHESIVE BANDAGE
30 BANDAGE TOPICAL DAILY PRN
Status: DISCONTINUED | OUTPATIENT
Start: 2022-09-08 | End: 2022-09-12

## 2022-09-08 RX ORDER — SUCRALFATE 1 G/1
1 TABLET ORAL
Status: DISCONTINUED | OUTPATIENT
Start: 2022-09-09 | End: 2022-09-12

## 2022-09-08 RX ORDER — ALLOPURINOL 100 MG/1
300 TABLET ORAL DAILY
Status: DISCONTINUED | OUTPATIENT
Start: 2022-09-09 | End: 2022-09-12

## 2022-09-08 RX ORDER — SERTRALINE HYDROCHLORIDE 50 MG/1
100 TABLET, FILM COATED ORAL DAILY
Status: DISCONTINUED | OUTPATIENT
Start: 2022-09-09 | End: 2022-09-12

## 2022-09-08 RX ORDER — FLECAINIDE ACETATE 100 MG/1
100 TABLET ORAL EVERY 12 HOURS
Status: DISCONTINUED | OUTPATIENT
Start: 2022-09-08 | End: 2022-09-12

## 2022-09-08 RX ORDER — METOPROLOL TARTRATE 25 MG/1
25 TABLET, FILM COATED ORAL 2 TIMES DAILY
Status: DISCONTINUED | OUTPATIENT
Start: 2022-09-09 | End: 2022-09-12

## 2022-09-08 RX ORDER — METFORMIN HYDROCHLORIDE 500 MG/1
750 TABLET ORAL 2 TIMES DAILY WITH MEALS
Status: DISCONTINUED | OUTPATIENT
Start: 2022-09-09 | End: 2022-09-09 | Stop reason: ALTCHOICE

## 2022-09-08 RX ORDER — ADHESIVE BANDAGE
30 BANDAGE TOPICAL DAILY PRN
Status: DISCONTINUED | OUTPATIENT
Start: 2022-09-08 | End: 2022-09-08

## 2022-09-08 RX ORDER — SERTRALINE HYDROCHLORIDE 50 MG/1
50 TABLET, FILM COATED ORAL DAILY
Status: DISCONTINUED | OUTPATIENT
Start: 2022-09-09 | End: 2022-09-08 | Stop reason: HOSPADM

## 2022-09-08 RX ADMIN — COLCHICINE 1.2 MG: 0.6 TABLET, FILM COATED ORAL at 14:54

## 2022-09-08 RX ADMIN — ATORVASTATIN CALCIUM 40 MG: 40 TABLET, FILM COATED ORAL at 23:35

## 2022-09-08 RX ADMIN — TRAZODONE HYDROCHLORIDE 100 MG: 100 TABLET ORAL at 23:35

## 2022-09-08 RX ADMIN — APIXABAN 5 MG: 5 TABLET, FILM COATED ORAL at 17:26

## 2022-09-08 RX ADMIN — INDOMETHACIN 50 MG: 25 CAPSULE ORAL at 23:41

## 2022-09-08 NOTE — BSMART NOTE
KENNA contacted by RN Yana Smalls, reporting that patient is now requesting to leave, and was told  rec an ECO if patient is no longer vol for admission. Writer spoke with patient, and patient said he would only like to leave if there are no beds. Patient's wife then asked writer if patient could participate in outpatient treatment. Patient was notified about possible available beds at two other [facility]. Patient declined and only wants to admit to [facility].  Patient then asked if writer can check with [employer]. [facility] and found that [facility] are no accepting referrals for bed search due to capacity. Patient was informed and said he wants to continue to hold for [address] [place] [address]. Patient is aware that an ECO will be issued if he changes his mind, which he then said he will continue to remain voluntary but would like a hospital room instead of hallway bed.
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
## 2022-09-08 NOTE — ED NOTES
This is a 29-year-old male who was turned over to me by Dr. Heather Covarrubias at change of shift. The patient is for admission to the psychiatric service and is supposed to be getting a bed at this hospital.  That has not yet happened. Patient has been resting comfortably during the shift and has been observed. There is no acute exacerbation noted. He has not required any interventions during the course of the shift. He will be turned over to the oncoming physician presently.

## 2022-09-08 NOTE — ED NOTES
Patient transferred changed into a gown, belongings secured in locker by SANTOS Farrell. Sitter at bedside.
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
## 2022-09-08 NOTE — ED NOTES
Verbal shift change report given to Agueda Palomares (oncoming nurse) by Janiya Esparza RN (offgoing nurse). Report included the following information SBAR, ED Summary, Intake/Output, MAR and Recent Results.

## 2022-09-08 NOTE — BSMART NOTE
Pt accepted by Dr. Kira Day to [facility] room 320-2. Nursing report to [phone]. Attending nurse/Lisa updated on admission.

## 2022-09-08 NOTE — ED NOTES
Verbal shift change report given to Margaret Noonan  (oncoming nurse) by Modesta Olea  (offgoing nurse). Report included the following information SBAR, ED Summary, MAR, and Recent Results.

## 2022-09-08 NOTE — ED TRIAGE NOTES
Triage: Pt arrives ambulatory from home with CC of Suicidal Ideation. Pt arrives via police. They report pt made statements tonight about his life insurance policy to his wife and then attempted to pull his gun out of his safe. His wife took the gun and hid it from him and then called police. They brought him to the ED voluntarily. He reports he has been struggling with insomnia lately. Denies hx of mental health problems or admissions.

## 2022-09-08 NOTE — ED NOTES
4:05 PM  Change of shift. Care of patient taken over from Dr. iMguel Bronson; H&P reviewed, bedside handoff complete. Awaiting Placement. 5:05 PM  Patient accepted at [facility] [place]. Remains stable and cooperative.     Lawanda Cam,

## 2022-09-08 NOTE — BSMART NOTE
Local Bed Search    [facility] [place]- declined bc of staff for 1:1  [facility]- declined bc of cpap being exclusionary  [facility]- geriatric facility at capacity  [facility]- cpap exclusionary

## 2022-09-08 NOTE — BSMART NOTE
Spoke with pt who presented as sad and depressed with intermittent tears. Pt agreed that he is willing to go to [facility] for voluntary inpatient psychiatric disorder. Pt focused on needing help for insomnia and the \"stuff going on through my mind. \" Bed access updated and presenting pt for psychiatric admission to [facility].

## 2022-09-08 NOTE — BSMART NOTE
BSMART evaluation complete; inpatient treatment is recommended due to recent suicide gestures/ ideations. Consulted with ED provider, PA Jesse Landau, who agrees with disposition. Suicide risk level noted to be high. Physician PA Jesse Landau notified. Concerns not observed. Security/Off- has not been notified.

## 2022-09-08 NOTE — ED PROVIDER NOTES
70-year-old male history of a flutter/A. fib with multiple cardioversions and ablations, type 2 diabetes, depression, hypertension, insomnia presenting to the ED for depression. History obtained from patient and wife at bedside. Notes that he has had issues with depression for a long time, patient reports many stressors in his life related to his own health issues and also things with both of his children, the recent loss of his brother. Wife notes that tonight \"he snapped,\" asked her if his life insurance policy was up-to-date and then attempted to pull a gun out of the safe. No prior admissions for mental health. No recent illness. Past medical history: List up-to-date  Social history: Non-smoker. No alcohol. No drugs. Works as a  for [employer]. The history is provided by the patient and the spouse. Suicidal  Pertinent negatives include no shortness of breath, no chest pain and no vomiting.       Past Medical History:   Diagnosis Date    Arrhythmia     A- FLUTTER  A-FIB CARDIOVERSION 8/2021, CARDIAC ABLATION 2018, 2020    COVID-19 vaccine series completed     Depression     Diabetes ([facility] [address]     Gout     Hypertension     Insomnia     Sleep apnea     CPAP       Past Surgical History:   Procedure Laterality Date    HX COLONOSCOPY      HX KNEE REPLACEMENT Right 2007    NEUROLOGICAL PROCEDURE UNLISTED  1997    LUMBAR 4-5 DISCECTOMY    IA CARDIAC SURG PROCEDURE UNLIST  2018, 2020    CARDIAC ABLATION    IA CARDIAC SURG PROCEDURE UNLIST  08/2021    CARDIOVERSION X 4 OR 5         Family History:   Problem Relation Age of Onset    Diabetes Mother     Dementia Mother     Hypertension Mother     Diabetes Father     Hypertension Father     Diabetes Sister     Diabetes Brother     Kidney Disease Brother     Heart Disease Maternal Grandfather     Diabetes Sister     Diabetes Sister     Anesth Problems Neg Hx        Social History     Socioeconomic History    Marital status:      Spouse name: Not on file    Number of children: Not on file    Years of education: Not on file    Highest education level: Not on file   Occupational History    Not on file   Tobacco Use    Smoking status: Never    Smokeless tobacco: Never   Vaping Use    Vaping Use: Never used   Substance and Sexual Activity    Alcohol use: Not Currently    Drug use: Never    Sexual activity: Not on file   Other Topics Concern    Not on file   Social History Narrative    Not on file     Social Determinants of Health     Financial Resource Strain: Not on file   Food Insecurity: Not on file   Transportation Needs: Not on file   Physical Activity: Not on file   Stress: Not on file   Social Connections: Not on file   Intimate Partner Violence: Not on file   Housing Stability: Not on file         ALLERGIES: Azithromycin, Erythromycin, and Pcn [penicillins]    Review of Systems   Constitutional:  Negative for fever. HENT:  Negative for facial swelling. Respiratory:  Negative for shortness of breath. Cardiovascular:  Negative for chest pain. Gastrointestinal:  Negative for vomiting. Musculoskeletal:  Negative for neck stiffness. Skin:  Negative for wound. Neurological:  Negative for syncope. Psychiatric/Behavioral:  Positive for dysphoric mood and suicidal ideas. All other systems reviewed and are negative. Vitals:    09/07/22 2340   BP: 130/83   Pulse: (!) 57   Resp: 16   Temp: 98.3 °F (36.8 °C)   SpO2: 96%            Physical Exam  Vitals and nursing note reviewed. Constitutional:       General: He is not in acute distress. Appearance: He is well-developed. Comments: Flat affect, no distress   HENT:      Head: Normocephalic and atraumatic. Right Ear: External ear normal.      Left Ear: External ear normal.   Eyes:      General: No scleral icterus. Conjunctiva/sclera: Conjunctivae normal.   Neck:      Trachea: No tracheal deviation.    Cardiovascular:      Rate and Rhythm: Normal rate and regular rhythm. Heart sounds: Normal heart sounds. No murmur heard. No friction rub. No gallop. Pulmonary:      Effort: Pulmonary effort is normal. No respiratory distress. Breath sounds: Normal breath sounds. No stridor. No wheezing. Abdominal:      General: There is no distension. Palpations: Abdomen is soft. Musculoskeletal:         General: Normal range of motion. Cervical back: Neck supple. Skin:     General: Skin is warm and dry. Neurological:      Mental Status: He is alert and oriented to person, place, and time. Psychiatric:         Behavior: Behavior normal.        MDM  Number of Diagnoses or Management Options  Depression, unspecified depression type  Diagnosis management comments: 59-year-old male presenting to the ED for depression, threatened to take a gun and commit suicide tonight. Patient with longstanding history of A. fib, a flutter, last had a cardioversion but a month ago. Will have patient evaluated by counselor, anticipate admission to mental health. Amount and/or Complexity of Data Reviewed  Discuss the patient with other providers: yes (Dr. Kishore Mullins ED attending)           Procedures                 Care transferred to ED attending at end of shift pending medical clearance - tentative plan for patient to be admitted to psych.   NICOLAS Dawson  2:41 AM

## 2022-09-09 PROCEDURE — 74011250637 HC RX REV CODE- 250/637: Performed by: PSYCHIATRY & NEUROLOGY

## 2022-09-09 PROCEDURE — 65220000003 HC RM SEMIPRIVATE PSYCH

## 2022-09-09 PROCEDURE — 74011250637 HC RX REV CODE- 250/637

## 2022-09-09 RX ORDER — METFORMIN HYDROCHLORIDE 750 MG/1
750 TABLET, EXTENDED RELEASE ORAL 2 TIMES DAILY WITH MEALS
Status: DISCONTINUED | OUTPATIENT
Start: 2022-09-09 | End: 2022-09-12

## 2022-09-09 RX ORDER — INDOMETHACIN 25 MG/1
50 CAPSULE ORAL
Status: DISCONTINUED | OUTPATIENT
Start: 2022-09-09 | End: 2022-09-12

## 2022-09-09 RX ORDER — PANTOPRAZOLE SODIUM 40 MG/1
40 TABLET, DELAYED RELEASE ORAL
COMMUNITY

## 2022-09-09 RX ORDER — FLECAINIDE ACETATE 100 MG/1
100 TABLET ORAL EVERY 12 HOURS
COMMUNITY

## 2022-09-09 RX ORDER — SUCRALFATE 1 G/1
1 TABLET ORAL
COMMUNITY

## 2022-09-09 RX ORDER — SERTRALINE HYDROCHLORIDE 100 MG/1
100 TABLET, FILM COATED ORAL DAILY
COMMUNITY

## 2022-09-09 RX ORDER — TEMAZEPAM 15 MG/1
15 CAPSULE ORAL
Status: DISCONTINUED | OUTPATIENT
Start: 2022-09-09 | End: 2022-09-12

## 2022-09-09 RX ORDER — TRAZODONE HYDROCHLORIDE 50 MG/1
50 TABLET ORAL 2 TIMES DAILY
Status: DISCONTINUED | OUTPATIENT
Start: 2022-09-09 | End: 2022-09-12

## 2022-09-09 RX ORDER — METOPROLOL TARTRATE 25 MG/1
TABLET, FILM COATED ORAL 2 TIMES DAILY
COMMUNITY

## 2022-09-09 RX ADMIN — ATORVASTATIN CALCIUM 40 MG: 40 TABLET, FILM COATED ORAL at 21:37

## 2022-09-09 RX ADMIN — PANTOPRAZOLE SODIUM 40 MG: 40 TABLET, DELAYED RELEASE ORAL at 18:11

## 2022-09-09 RX ADMIN — FLECAINIDE ACETATE 100 MG: 100 TABLET ORAL at 09:15

## 2022-09-09 RX ADMIN — METFORMIN HYDROCHLORIDE 750 MG: 500 TABLET ORAL at 08:07

## 2022-09-09 RX ADMIN — SERTRALINE HYDROCHLORIDE 100 MG: 50 TABLET ORAL at 08:06

## 2022-09-09 RX ADMIN — METFORMIN HYDROCHLORIDE 750 MG: 750 TABLET, EXTENDED RELEASE ORAL at 18:10

## 2022-09-09 RX ADMIN — APIXABAN 5 MG: 5 TABLET, FILM COATED ORAL at 08:06

## 2022-09-09 RX ADMIN — INDOMETHACIN 50 MG: 25 CAPSULE ORAL at 18:19

## 2022-09-09 RX ADMIN — SUCRALFATE 1 G: 1 TABLET ORAL at 06:32

## 2022-09-09 RX ADMIN — METOPROLOL TARTRATE 25 MG: 25 TABLET, FILM COATED ORAL at 18:11

## 2022-09-09 RX ADMIN — METOPROLOL TARTRATE 25 MG: 25 TABLET, FILM COATED ORAL at 08:06

## 2022-09-09 RX ADMIN — TRAZODONE HYDROCHLORIDE 50 MG: 50 TABLET ORAL at 21:37

## 2022-09-09 RX ADMIN — SUCRALFATE 1 G: 1 TABLET ORAL at 18:10

## 2022-09-09 RX ADMIN — ALLOPURINOL 300 MG: 100 TABLET ORAL at 08:05

## 2022-09-09 RX ADMIN — TRAZODONE HYDROCHLORIDE 50 MG: 50 TABLET ORAL at 18:10

## 2022-09-09 RX ADMIN — APIXABAN 5 MG: 5 TABLET, FILM COATED ORAL at 21:37

## 2022-09-09 RX ADMIN — FLECAINIDE ACETATE 100 MG: 100 TABLET ORAL at 21:37

## 2022-09-09 RX ADMIN — TEMAZEPAM 15 MG: 15 CAPSULE ORAL at 21:37

## 2022-09-09 RX ADMIN — SUCRALFATE 1 G: 1 TABLET ORAL at 12:58

## 2022-09-09 RX ADMIN — PANTOPRAZOLE SODIUM 40 MG: 40 TABLET, DELAYED RELEASE ORAL at 06:32
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
## 2022-09-09 NOTE — PROGRESS NOTES
Problem: Suicide  Goal: *STG: Remains safe in hospital  Outcome: Progressing Towards Goal  Goal: *STG/LTG: No longer expresses self destructive or suicidal thoughts  Outcome: Progressing Towards Goal     Problem: Falls - Risk of  Goal: *Absence of Falls  Description: Document Ross Fall Risk and appropriate interventions in the flowsheet.   Outcome: Progressing Towards Goal  Note: Fall Risk Interventions:     Medication Interventions: Teach patient to arise slowly

## 2022-09-09 NOTE — BSMART NOTE
SW student called Pt's wife Anahi Duron [phone] to update her regarding his progress however she did not answer.

## 2022-09-09 NOTE — GROUP NOTE
MARY  GROUP DOCUMENTATION INDIVIDUAL                                                                          Group Therapy Note    Date: 9/9/2022    Group Start Time: 1000  Group End Time: 1100  Group Topic: Topic Group    [facility] - [place] [address] ACUTE BEHAV [facility]    [place], [address] [facility] GROUP DOCUMENTATION GROUP    Group Therapy Note    Attendees: 9       Attendance: Attended    Patient's Goal:  To identify positive coping strategies a-z    Interventions/techniques: Supported-game    Follows Directions:  Followed directions    Interactions: Interacted appropriately    Mental Status: Calm    Behavior/appearance: Attentive and Cooperative    Goals Achieved: Able to engage in interactions, Able to listen to others, and Discussed coping      Additional Notes:  Later,listened to music    Monica Humphrey

## 2022-09-09 NOTE — BH NOTES
----- Message from Britney Goncalves sent at 4/22/2019 12:59 PM CDT -----  Regarding: question on medication  Contact: [phone]  Dalila, pt's wife, called wondering if Andreas can take Aleve for aches and pain with his new medication.  Please call Andreas at [phone] or Dalila at [phone]. Thank you Britney   GROUP THERAPY PROGRESS NOTE    Gilma Mart is participating in Positive Psychology Group. Group time: 30 minutes    Personal goal for participation: To identify personal strengths to increase self esteem and self image. Goal orientation: personal    Group therapy participation: active    Therapeutic interventions reviewed and discussed:     Impression of participation: Patient was an active participant in the group although he declined the worksheet. 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 Pt was able to listen intently to others in the group and named grandchildren as points of pride.

## 2022-09-09 NOTE — PROGRESS NOTES
[facility]: MED RECONCILIATION    Comments/Recommendations:   Pantoprazole and sucralfate doses will be ending soon. Pt was taking for only 30 days for an ablation. Medications added:     Flecainide  Metoprolol Tartrate  Sertraline  Pantoprazole  Sucralfate    Medications removed:    Cyanocobalamin (Vitamin B-12) 1000 mcg tablets  Pyridoxine (Vitamin B-6) 100 mg tablets  Sotalol 120 mg tablets    Information obtained from: Patient    Allergies: Azithromycin, Erythromycin, and Pcn [penicillins]    Prior to Admission Medications:   Prior to Admission Medications   Prescriptions Last Dose Informant Patient Reported? Taking?   allopurinoL (ZYLOPRIM) 300 mg tablet  Self Yes No   Sig: Take 300 mg by mouth nightly. apixaban (ELIQUIS) 5 mg tablet  Self Yes No   Sig: Take 5 mg by mouth two (2) times a day. ascorbic acid, vitamin C, (VITAMIN C) 500 mg tablet  Self Yes No   Sig: Take 1,000 mg by mouth daily. dulaglutide (Trulicity) 8.36 HS/3.3 mL sub-q pen  Self Yes No   Si.75 mg by SubCUTAneous route every seven (7) days. PT TAKES ON    empagliflozin (JARDIANCE) 25 mg tablet  Self Yes No   Sig: Take 25 mg by mouth Every morning. flecainide (TAMBOCOR) 100 mg tablet  Self Yes Yes   Sig: Take 100 mg by mouth every twelve (12) hours. metFORMIN ER (GLUCOPHAGE XR) 750 mg tablet  Self Yes No   Sig: Take 750 mg by mouth two (2) times a day. metoprolol tartrate (LOPRESSOR) 25 mg tablet  Self Yes Yes   Sig: Take  by mouth two (2) times a day. multivitamin (ONE A DAY) tablet  Self Yes No   Sig: Take 1 Tablet by mouth daily. pantoprazole (PROTONIX) 40 mg tablet  Self Yes Yes   Sig: Take 40 mg by mouth Before breakfast and dinner. sertraline (ZOLOFT) 100 mg tablet  Self Yes Yes   Sig: Take 100 mg by mouth daily. simvastatin (ZOCOR) 40 mg tablet  Self Yes No   Sig: Take 40 mg by mouth nightly.    sucralfate (CARAFATE) 1 gram tablet  Self Yes Yes   Sig: Take 1 g by mouth Before breakfast, lunch, dinner and at bedtime. temazepam (RESTORIL) 15 mg capsule  Self Yes No   Sig: Take 15 mg by mouth nightly. traZODone (DESYREL) 50 mg tablet  Self Yes No   Sig: Take 100 mg by mouth two (2) times a day.  Take 50 mg by mouth at 5 pm and 8 pm      Facility-Administered Medications: None         Svetlana Martin, PharmD Candidate

## 2022-09-09 NOTE — BH NOTES
GROUP THERAPY PROGRESS NOTE    Agustin Brown is participating in Mindfulness Group. Group time: 30 minutes    Personal goal for participation: To practice mindfulness techniques and identify emotions. Goal orientation: personal    Group therapy participation: active    Therapeutic interventions reviewed and discussed:     Impression of participation: Patient was very engaged in group discussion. Pt offered anecdotes about coping with stress and negative emotions by gardening and \"taking a moment\". Pt offered thoughts to other patients when appropriate. Pt had a calm affect and seemed to be very aware of surroundings.

## 2022-09-09 NOTE — GROUP NOTE
MARY  GROUP DOCUMENTATION INDIVIDUAL                                                                          Group Therapy Note    Date: 9/9/2022    Group Start Time: 1400  Group End Time: 1500  Group Topic: Recreational/Music Therapy    [facility] - [place] [address] ACUTE BEHAV [facility]    [place], [address] [facility] GROUP DOCUMENTATION GROUP    Group Therapy Note    Attendees: 9       Attendance: Attended    Patient's Goal:  To concentrate on selected task    Interventions/techniques: Supported-crafts,games,music      Interactions: Minimal-pleasant upon approach    Mental Status: Calm    Behavior/appearance: Cooperative and Needed prompting    Goals Achieved:  Attended group session-elected to watch      Nelida Jacobs

## 2022-09-09 NOTE — PROGRESS NOTES
Pt alert and oriented x 4. Pt Sitting quietly in room. Pt calm and cooperative. Pt denies SI/HI/AVH depression and anxiety. Pt  compliant with medication. Pt states he is being treated for a Gout inflammation. Pts left foot  visibly red and swelling noted. Pt focused on returning home and appears to minimize current circumstances. Pt encouraged to participate in care.

## 2022-09-09 NOTE — PROGRESS NOTES
Problem: Altered Thought Process (Adult/Pediatric)  Goal: *STG: Participates in treatment plan  Outcome: Progressing Towards Goal  Goal: *STG: Remains safe in hospital  Outcome: Progressing Towards Goal  Goal: *STG: Complies with medication therapy  Outcome: Progressing Towards Goal     Problem: Depressed Mood (Adult/Pediatric)  Goal: *STG: Participates in treatment plan  Outcome: Progressing Towards Goal

## 2022-09-10 PROCEDURE — 74011250637 HC RX REV CODE- 250/637

## 2022-09-10 PROCEDURE — 65220000003 HC RM SEMIPRIVATE PSYCH

## 2022-09-10 PROCEDURE — 74011250637 HC RX REV CODE- 250/637: Performed by: PSYCHIATRY & NEUROLOGY

## 2022-09-10 RX ADMIN — METOPROLOL TARTRATE 25 MG: 25 TABLET, FILM COATED ORAL at 08:29

## 2022-09-10 RX ADMIN — PANTOPRAZOLE SODIUM 40 MG: 40 TABLET, DELAYED RELEASE ORAL at 06:26

## 2022-09-10 RX ADMIN — METOPROLOL TARTRATE 25 MG: 25 TABLET, FILM COATED ORAL at 16:38

## 2022-09-10 RX ADMIN — ATORVASTATIN CALCIUM 40 MG: 40 TABLET, FILM COATED ORAL at 21:18

## 2022-09-10 RX ADMIN — TEMAZEPAM 15 MG: 15 CAPSULE ORAL at 21:17

## 2022-09-10 RX ADMIN — ALLOPURINOL 300 MG: 100 TABLET ORAL at 08:29

## 2022-09-10 RX ADMIN — SUCRALFATE 1 G: 1 TABLET ORAL at 21:18

## 2022-09-10 RX ADMIN — SERTRALINE HYDROCHLORIDE 100 MG: 50 TABLET ORAL at 08:29

## 2022-09-10 RX ADMIN — PANTOPRAZOLE SODIUM 40 MG: 40 TABLET, DELAYED RELEASE ORAL at 17:04

## 2022-09-10 RX ADMIN — TRAZODONE HYDROCHLORIDE 50 MG: 50 TABLET ORAL at 16:39

## 2022-09-10 RX ADMIN — FLECAINIDE ACETATE 100 MG: 100 TABLET ORAL at 21:18

## 2022-09-10 RX ADMIN — FLECAINIDE ACETATE 100 MG: 100 TABLET ORAL at 08:29

## 2022-09-10 RX ADMIN — SUCRALFATE 1 G: 1 TABLET ORAL at 16:38

## 2022-09-10 RX ADMIN — SUCRALFATE 1 G: 1 TABLET ORAL at 06:25

## 2022-09-10 RX ADMIN — METFORMIN HYDROCHLORIDE 750 MG: 750 TABLET, EXTENDED RELEASE ORAL at 08:29

## 2022-09-10 RX ADMIN — ACETAMINOPHEN 650 MG: 325 TABLET, FILM COATED ORAL at 16:38

## 2022-09-10 RX ADMIN — APIXABAN 5 MG: 5 TABLET, FILM COATED ORAL at 21:18

## 2022-09-10 RX ADMIN — TRAZODONE HYDROCHLORIDE 50 MG: 50 TABLET ORAL at 21:18

## 2022-09-10 RX ADMIN — METFORMIN HYDROCHLORIDE 750 MG: 750 TABLET, EXTENDED RELEASE ORAL at 16:38

## 2022-09-10 RX ADMIN — APIXABAN 5 MG: 5 TABLET, FILM COATED ORAL at 08:29

## 2022-09-10 NOTE — BH NOTES
CHIEF COMPLAINT:  \"I'm OK\"    LENGTH OF STAY:  2 days    INTERVAL HISTORY:  Susan Alcala states that he is doing ok. He endorses that he had an anger outburst at home and is trying to figure out what happened. He endorses decreased sleep due to the bed being uncomfortable. He denies SI/HI/AVH currently. He states that he is eating well. He endorses looking forward to going home. VITALS:  Patient Vitals for the past 24 hrs:   Temp Pulse Resp BP SpO2   09/10/22 1638 -- 62 -- 114/71 --   09/10/22 0818 97.5 °F (36.4 °C) 62 18 127/67 99 %   09/09/22 2030 98.1 °F (36.7 °C) 68 16 126/76 99 %      LABS:    No results found for this or any previous visit (from the past 24 hour(s)).      MEDICATIONS:    Current Facility-Administered Medications:     traZODone (DESYREL) tablet 50 mg, 50 mg, Oral, BID, Etha Lias A, NP, 50 mg at 09/10/22 1639    metFORMIN ER (GLUCOPHAGE XR) tablet 750 mg, 750 mg, Oral, BID WITH MEALS, Etha Lias A, NP, 750 mg at 09/10/22 1638    temazepam (RESTORIL) capsule 15 mg, 15 mg, Oral, QHS, Etha Lias A, NP, 15 mg at 09/09/22 2137    indomethacin (INDOCIN) capsule 50 mg, 50 mg, Oral, TID PRN, Etha Lias A, NP, 50 mg at 09/09/22 1819    acetaminophen (TYLENOL) tablet 650 mg, 650 mg, Oral, Q4H PRN, Basir, Nasr, NP, 650 mg at 09/10/22 1638    sucralfate (CARAFATE) tablet 1 g, 1 g, Oral, AC&HS, Basir, Nasr, NP, 1 g at 09/10/22 1638    pantoprazole (PROTONIX) tablet 40 mg, 40 mg, Oral, ACB&D, Basir, Nasr, NP, 40 mg at 09/10/22 1704    flecainide (TAMBOCOR) tablet 100 mg, 100 mg, Oral, Q12H, Basir, Nasr, NP, 100 mg at 09/10/22 0829    allopurinoL (ZYLOPRIM) tablet 300 mg, 300 mg, Oral, DAILY, Basir, Nasr, NP, 300 mg at 09/10/22 0829    metoprolol tartrate (LOPRESSOR) tablet 25 mg, 25 mg, Oral, BID, Basir, Nasr, NP, 25 mg at 09/10/22 1638    sertraline (ZOLOFT) tablet 100 mg, 100 mg, Oral, DAILY, Basir, Nasr, NP, 100 mg at 09/10/22 0829    atorvastatin (LIPITOR) tablet 40 mg, 40 mg, Oral, QHS, Basir, Nasr, NP, 40 mg at 09/09/22 2137    OLANZapine (ZyPREXA) tablet 2.5 mg, 2.5 mg, Oral, Q6H PRN, Slava Reed NP    diphenhydrAMINE (BENADRYL) injection 25 mg, 25 mg, IntraMUSCular, BID PRN, Nick Olvera NP    magnesium hydroxide (MILK OF MAGNESIA) 400 mg/5 mL oral suspension 30 mL, 30 mL, Oral, DAILY PRN, Slava Reed NP    apixaban (ELIQUIS) tablet 5 mg, 5 mg, Oral, Q12H, Crispin Eaton MD, 5 mg at 09/10/22 1304     DIAGNOSIS:  Unspecified Mood disorder    PLAN:  Continue current medications. Dispo planning per case management. I certify that this patient's inpatient psychiatric hospital services furnished since the previous certification were, and continue to be, required for treatment that could reasonably be expected to improve the patient's condition, or for diagnostic study, and that the patient continues to need, on a daily basis, active treatment furnished directly by or requiring the supervision of inpatient psychiatric facility personnel. In addition, the hospital records show that services furnished were intensive treatment services, admission or related services, or equivalent services.

## 2022-09-10 NOTE — PROGRESS NOTES
Auto-MST trigger per RN admission assessment. No acute nutrition issues identified. CC diet ordered (4 CHO choices), pt meal compliant. Hx notable for DM, HTN, gout.   Ht: 6'1\"  Wt: 262 lb 3.2 oz  BMI: 34.59 kg/(m^2) c/w obesity class I  Est energy needs: 2355 kcal, 75 g protein, 2500 mL fluids  Pt will consume > 75% of meals at follow up 7-10 days  LOS, MST

## 2022-09-10 NOTE — PROGRESS NOTES
Problem: Altered Thought Process (Adult/Pediatric)  Goal: *STG: Participates in treatment plan  Outcome: Progressing Towards Goal  Goal: *STG: Remains safe in hospital  Outcome: Progressing Towards Goal  Goal: *STG: Seeks staff when feelings of anxiety and fear arise  Outcome: Progressing Towards Goal  Goal: *STG: Complies with medication therapy  Outcome: Progressing Towards Goal    [id number]- Report given by day shift nurse and I assume care of the patient. He is laughing and joking with staff and is in a good mood. He denies SI/HI/AVH. He denies anxiety and depression and is eating in the dayroom. Patient is given scheduled medication and said that Trazodone is working well for him at night. 0700- Patient slept 6 hrs with CPAP and took morning meds.

## 2022-09-10 NOTE — PROGRESS NOTES
Problem: Altered Thought Process (Adult/Pediatric)  Goal: *STG: Participates in treatment plan  Outcome: Progressing Towards Goal  Goal: *STG: Remains safe in hospital  Outcome: Progressing Towards Goal     Shift change report given to Dee eDe NORIEGA (oncoming nurse) by Wilma Cao (offgoing nurse). Report included the following information SBAR, Kardex, MAR, and Recent Results. Assumed care of patient. Met patient in dayroom. Calm and cooperative during assessment. Patient smiling, presented with euthymic mood. Patient denied anxiety, depression, SI, HI and AVH. No complaints of pain. Medication and meal compliant. Patient c/o headache, PRN Tylenol given with evening medications. Patient visible on unit, to dayroom to watch TV and talk to peers. No issues noted throughout shift.

## 2022-09-11 PROCEDURE — 74011250637 HC RX REV CODE- 250/637

## 2022-09-11 PROCEDURE — 74011250637 HC RX REV CODE- 250/637: Performed by: PSYCHIATRY & NEUROLOGY

## 2022-09-11 PROCEDURE — 65220000003 HC RM SEMIPRIVATE PSYCH

## 2022-09-11 RX ADMIN — TRAZODONE HYDROCHLORIDE 50 MG: 50 TABLET ORAL at 17:26

## 2022-09-11 RX ADMIN — APIXABAN 5 MG: 5 TABLET, FILM COATED ORAL at 21:22

## 2022-09-11 RX ADMIN — ATORVASTATIN CALCIUM 40 MG: 40 TABLET, FILM COATED ORAL at 21:29

## 2022-09-11 RX ADMIN — TEMAZEPAM 15 MG: 15 CAPSULE ORAL at 21:29

## 2022-09-11 RX ADMIN — TRAZODONE HYDROCHLORIDE 50 MG: 50 TABLET ORAL at 21:22

## 2022-09-11 RX ADMIN — SUCRALFATE 1 G: 1 TABLET ORAL at 08:26

## 2022-09-11 RX ADMIN — SUCRALFATE 1 G: 1 TABLET ORAL at 21:22

## 2022-09-11 RX ADMIN — METOPROLOL TARTRATE 25 MG: 25 TABLET, FILM COATED ORAL at 08:26

## 2022-09-11 RX ADMIN — PANTOPRAZOLE SODIUM 40 MG: 40 TABLET, DELAYED RELEASE ORAL at 05:58

## 2022-09-11 RX ADMIN — ALLOPURINOL 300 MG: 100 TABLET ORAL at 08:26

## 2022-09-11 RX ADMIN — PANTOPRAZOLE SODIUM 40 MG: 40 TABLET, DELAYED RELEASE ORAL at 17:26

## 2022-09-11 RX ADMIN — SUCRALFATE 1 G: 1 TABLET ORAL at 12:10

## 2022-09-11 RX ADMIN — SERTRALINE HYDROCHLORIDE 100 MG: 50 TABLET ORAL at 08:26

## 2022-09-11 RX ADMIN — ACETAMINOPHEN 650 MG: 325 TABLET, FILM COATED ORAL at 11:44

## 2022-09-11 RX ADMIN — SUCRALFATE 1 G: 1 TABLET ORAL at 17:26

## 2022-09-11 RX ADMIN — ACETAMINOPHEN 650 MG: 325 TABLET, FILM COATED ORAL at 19:56

## 2022-09-11 RX ADMIN — APIXABAN 5 MG: 5 TABLET, FILM COATED ORAL at 08:26

## 2022-09-11 RX ADMIN — METFORMIN HYDROCHLORIDE 750 MG: 750 TABLET, EXTENDED RELEASE ORAL at 08:26

## 2022-09-11 RX ADMIN — FLECAINIDE ACETATE 100 MG: 100 TABLET ORAL at 21:22

## 2022-09-11 RX ADMIN — FLECAINIDE ACETATE 100 MG: 100 TABLET ORAL at 08:26

## 2022-09-11 RX ADMIN — METOPROLOL TARTRATE 25 MG: 25 TABLET, FILM COATED ORAL at 17:26

## 2022-09-11 RX ADMIN — METFORMIN HYDROCHLORIDE 750 MG: 750 TABLET, EXTENDED RELEASE ORAL at 17:26

## 2022-09-11 NOTE — PROGRESS NOTES
Problem: Altered Thought Process (Adult/Pediatric)  Goal: *STG: Participates in treatment plan  Outcome: Progressing Towards Goal  Goal: *STG: Remains safe in hospital  Outcome: Progressing Towards Goal  Goal: *STG: Seeks staff when feelings of anxiety and fear arise  Outcome: Progressing Towards Goal     Problem: Depressed Mood (Adult/Pediatric)  Goal: *STG: Complies with medication therapy  Outcome: Progressing Towards Goal     Problem: Suicide  Goal: *STG: Remains safe in hospital  Outcome: Progressing Towards Goal  Goal: *STG/LTG: No longer expresses self destructive or suicidal thoughts  Outcome: Progressing Towards Goal     Problem: Falls - Risk of  Goal: *Absence of Falls  Description: Document Ross Fall Risk and appropriate interventions in the flowsheet. Outcome: Progressing Towards Goal  Note: Fall Risk Interventions:     Medication Interventions: Teach patient to arise slowly      Patient received resting in his room. Denies SI/HI/AVH, depression and anxiety. Calm, cooperative, out on the unit, walking in the schmitt, interacting with his peers. Taking medications as prescribed. Will continue to monitor for safety.

## 2022-09-11 NOTE — PROGRESS NOTES
Problem: Altered Thought Process (Adult/Pediatric)  Goal: *STG: Participates in treatment plan  Outcome: Progressing Towards Goal  Goal: *STG: Remains safe in hospital  Outcome: Progressing Towards Goal  Goal: *STG: Seeks staff when feelings of anxiety and fear arise  Outcome: Progressing Towards Goal  Goal: *STG: Complies with medication therapy  Outcome: Progressing Towards Goal  Goal: *STG: Attends activities and groups  Outcome: Progressing Towards Goal    [id number]- Report given by Priyanka Martin RN and I assume care of the patient. Patient denies SI/HI/AVH. He denies anxiety and depression. The patient is AOX4 and talking in the dayroom with peers while eating snacks. He is compliant with evening medications and walking with peers at times. He has been sleeping most of the night and stated that he is in a good mood. 0700- Patient slept for 7.5 hrs.

## 2022-09-11 NOTE — BH NOTES
Psychiatric Progress Note    CHIEF COMPLAINT:  \"I'm OK\"     LENGTH OF STAY:  2 days     INTERVAL HISTORY:  Kristine Bender states that he is doing ok. He endorses that he had an anger outburst at home and is trying to figure out what happened. He endorses decreased sleep due to the bed being uncomfortable. He denies SI/HI/AVH currently. He states that he is eating well. He endorses looking forward to going home. 9/11-Matthew states that he is doing well. He denies SI/HI/AVH currently. He states that he is eating and sleeping well. He endorses tolerating the medications. He denies any side effects. He denies any questions or concerns. VITALS:  Patient Vitals for the past 24 hrs:    Temp Pulse Resp BP SpO2   09/10/22 1638 -- 62 -- 114/71 --   09/10/22 0818 97.5 °F (36.4 °C) 62 18 127/67 99 %   09/09/22 2030 98.1 °F (36.7 °C) 68 16 126/76 99 %      LABS:     Recent Results   No results found for this or any previous visit (from the past 24 hour(s)).          MEDICATIONS:     Current Facility-Administered Medications:     traZODone (DESYREL) tablet 50 mg, 50 mg, Oral, BID, Santos Roxanne A, NP, 50 mg at 09/10/22 1639    metFORMIN ER (GLUCOPHAGE XR) tablet 750 mg, 750 mg, Oral, BID WITH MEALS, Santos Roxanne A, NP, 750 mg at 09/10/22 1638    temazepam (RESTORIL) capsule 15 mg, 15 mg, Oral, QHS, Santos Roxanne A, NP, 15 mg at 09/09/22 2137    indomethacin (INDOCIN) capsule 50 mg, 50 mg, Oral, TID PRN, Kell Sabal, NP, 50 mg at 09/09/22 1819    acetaminophen (TYLENOL) tablet 650 mg, 650 mg, Oral, Q4H PRN, Basir, Nasr, NP, 650 mg at 09/10/22 1638    sucralfate (CARAFATE) tablet 1 g, 1 g, Oral, AC&HS, Basir, Nasr, NP, 1 g at 09/10/22 1638    pantoprazole (PROTONIX) tablet 40 mg, 40 mg, Oral, ACB&D, Basir, Nasr, NP, 40 mg at 09/10/22 1704    flecainide (TAMBOCOR) tablet 100 mg, 100 mg, Oral, Q12H, Basir, Nasr, NP, 100 mg at 09/10/22 0829    allopurinoL (ZYLOPRIM) tablet 300 mg, 300 mg, Oral, DAILY, Basir, Nasr, NP, 300 mg at 09/10/22 6459    metoprolol tartrate (LOPRESSOR) tablet 25 mg, 25 mg, Oral, BID, Basir, Nasr, NP, 25 mg at 09/10/22 1638    sertraline (ZOLOFT) tablet 100 mg, 100 mg, Oral, DAILY, Basir, Nasr, NP, 100 mg at 09/10/22 0829    atorvastatin (LIPITOR) tablet 40 mg, 40 mg, Oral, QHS, Basir, Nasr, NP, 40 mg at 09/09/22 2137    OLANZapine (ZyPREXA) tablet 2.5 mg, 2.5 mg, Oral, Q6H PRN, Colette Diaz NP    diphenhydrAMINE (BENADRYL) injection 25 mg, 25 mg, IntraMUSCular, BID PRN, Vedia Anuradha, Mor, NP    magnesium hydroxide (MILK OF MAGNESIA) 400 mg/5 mL oral suspension 30 mL, 30 mL, Oral, DAILY PRN, Colette Diaz NP    apixaban (ELIQUIS) tablet 5 mg, 5 mg, Oral, Q12H, Jenn Faye MD, 5 mg at 09/10/22 8448      DIAGNOSIS:  Unspecified Mood disorder     PLAN:  Continue current medications. Dispo planning per case management. I certify that this patient's inpatient psychiatric hospital services furnished since the previous certification were, and continue to be, required for treatment that could reasonably be expected to improve the patient's condition, or for diagnostic study, and that the patient continues to need, on a daily basis, active treatment furnished directly by or requiring the supervision of inpatient psychiatric facility personnel. In addition, the hospital records show that services furnished were intensive treatment services, admission or related services, or equivalent services.

## 2022-09-12 ENCOUNTER — HOSPITAL ENCOUNTER (INPATIENT)
Age: 67
LOS: 1 days | Discharge: HOME | DRG: 310 | End: 2022-09-13
Attending: STUDENT IN AN ORGANIZED HEALTH CARE EDUCATION/TRAINING PROGRAM | Admitting: STUDENT IN AN ORGANIZED HEALTH CARE EDUCATION/TRAINING PROGRAM
Payer: COMMERCIAL

## 2022-09-12 VITALS
TEMPERATURE: 97.5 F | HEIGHT: 73 IN | SYSTOLIC BLOOD PRESSURE: 142 MMHG | WEIGHT: 262.2 LBS | OXYGEN SATURATION: 100 % | HEART RATE: 132 BPM | RESPIRATION RATE: 24 BRPM | BODY MASS INDEX: 34.75 KG/M2 | DIASTOLIC BLOOD PRESSURE: 64 MMHG

## 2022-09-12 PROBLEM — I47.1 SUPRAVENTRICULAR TACHYCARDIA (HCC): Status: ACTIVE | Noted: 2022-09-12

## 2022-09-12 LAB
ALBUMIN SERPL-MCNC: 3.9 G/DL (ref 3.5–5)
ALBUMIN/GLOB SERPL: 1 {RATIO} (ref 1.1–2.2)
ALP SERPL-CCNC: 63 U/L (ref 45–117)
ALT SERPL-CCNC: 26 U/L (ref 12–78)
ANION GAP SERPL CALC-SCNC: 5 MMOL/L (ref 5–15)
AST SERPL-CCNC: 20 U/L (ref 15–37)
ATRIAL RATE: 300 BPM
BASOPHILS # BLD: 0.1 K/UL (ref 0–0.1)
BASOPHILS NFR BLD: 1 % (ref 0–1)
BILIRUB DIRECT SERPL-MCNC: 0.1 MG/DL (ref 0–0.2)
BILIRUB SERPL-MCNC: 0.6 MG/DL (ref 0.2–1)
BUN SERPL-MCNC: 22 MG/DL (ref 6–20)
BUN/CREAT SERPL: 16 (ref 12–20)
CALCIUM SERPL-MCNC: 9.5 MG/DL (ref 8.5–10.1)
CALCULATED R AXIS, ECG10: 77 DEGREES
CALCULATED T AXIS, ECG11: 53 DEGREES
CHLORIDE SERPL-SCNC: 104 MMOL/L (ref 97–108)
CO2 SERPL-SCNC: 31 MMOL/L (ref 21–32)
COMMENT, HOLDF: NORMAL
CREAT SERPL-MCNC: 1.34 MG/DL (ref 0.7–1.3)
DIAGNOSIS, 93000: NORMAL
DIFFERENTIAL METHOD BLD: ABNORMAL
EOSINOPHIL # BLD: 1.4 K/UL (ref 0–0.4)
EOSINOPHIL NFR BLD: 13 % (ref 0–7)
ERYTHROCYTE [DISTWIDTH] IN BLOOD BY AUTOMATED COUNT: 15.4 % (ref 11.5–14.5)
GLOBULIN SER CALC-MCNC: 3.9 G/DL (ref 2–4)
GLUCOSE BLD STRIP.AUTO-MCNC: 109 MG/DL (ref 65–117)
GLUCOSE SERPL-MCNC: 107 MG/DL (ref 65–100)
HCT VFR BLD AUTO: 47.1 % (ref 36.6–50.3)
HGB BLD-MCNC: 15.5 G/DL (ref 12.1–17)
IMM GRANULOCYTES # BLD AUTO: 0.1 K/UL (ref 0–0.04)
IMM GRANULOCYTES NFR BLD AUTO: 1 % (ref 0–0.5)
LYMPHOCYTES # BLD: 3.1 K/UL (ref 0.8–3.5)
LYMPHOCYTES NFR BLD: 29 % (ref 12–49)
MAGNESIUM SERPL-MCNC: 1.7 MG/DL (ref 1.6–2.4)
MCH RBC QN AUTO: 28.3 PG (ref 26–34)
MCHC RBC AUTO-ENTMCNC: 32.9 G/DL (ref 30–36.5)
MCV RBC AUTO: 86.1 FL (ref 80–99)
MONOCYTES # BLD: 1.1 K/UL (ref 0–1)
MONOCYTES NFR BLD: 10 % (ref 5–13)
NEUTS SEG # BLD: 4.8 K/UL (ref 1.8–8)
NEUTS SEG NFR BLD: 46 % (ref 32–75)
NRBC # BLD: 0 K/UL (ref 0–0.01)
NRBC BLD-RTO: 0 PER 100 WBC
PHOSPHATE SERPL-MCNC: 3.6 MG/DL (ref 2.6–4.7)
PLATELET # BLD AUTO: 225 K/UL (ref 150–400)
PMV BLD AUTO: 10.2 FL (ref 8.9–12.9)
POTASSIUM SERPL-SCNC: 4.1 MMOL/L (ref 3.5–5.1)
PROT SERPL-MCNC: 7.8 G/DL (ref 6.4–8.2)
Q-T INTERVAL, ECG07: 294 MS
QRS DURATION, ECG06: 90 MS
QTC CALCULATION (BEZET), ECG08: 437 MS
RBC # BLD AUTO: 5.47 M/UL (ref 4.1–5.7)
RBC MORPH BLD: ABNORMAL
SAMPLES BEING HELD,HOLD: NORMAL
SERVICE CMNT-IMP: NORMAL
SODIUM SERPL-SCNC: 140 MMOL/L (ref 136–145)
TROPONIN-HIGH SENSITIVITY: 40 NG/L (ref 0–76)
TROPONIN-HIGH SENSITIVITY: 44 NG/L (ref 0–76)
VENTRICULAR RATE, ECG03: 133 BPM
WBC # BLD AUTO: 10.6 K/UL (ref 4.1–11.1)

## 2022-09-12 PROCEDURE — 93005 ELECTROCARDIOGRAM TRACING: CPT

## 2022-09-12 PROCEDURE — 74011000250 HC RX REV CODE- 250

## 2022-09-12 PROCEDURE — 74011250637 HC RX REV CODE- 250/637: Performed by: STUDENT IN AN ORGANIZED HEALTH CARE EDUCATION/TRAINING PROGRAM

## 2022-09-12 PROCEDURE — 74011250637 HC RX REV CODE- 250/637

## 2022-09-12 PROCEDURE — 84484 ASSAY OF TROPONIN QUANT: CPT

## 2022-09-12 PROCEDURE — 80048 BASIC METABOLIC PNL TOTAL CA: CPT

## 2022-09-12 PROCEDURE — 74011000250 HC RX REV CODE- 250: Performed by: STUDENT IN AN ORGANIZED HEALTH CARE EDUCATION/TRAINING PROGRAM

## 2022-09-12 PROCEDURE — 83735 ASSAY OF MAGNESIUM: CPT

## 2022-09-12 PROCEDURE — 84100 ASSAY OF PHOSPHORUS: CPT

## 2022-09-12 PROCEDURE — 65270000032 HC RM SEMIPRIVATE

## 2022-09-12 PROCEDURE — 74011250637 HC RX REV CODE- 250/637: Performed by: PSYCHIATRY & NEUROLOGY

## 2022-09-12 PROCEDURE — 74011250636 HC RX REV CODE- 250/636: Performed by: STUDENT IN AN ORGANIZED HEALTH CARE EDUCATION/TRAINING PROGRAM

## 2022-09-12 PROCEDURE — 82962 GLUCOSE BLOOD TEST: CPT

## 2022-09-12 PROCEDURE — 85025 COMPLETE CBC W/AUTO DIFF WBC: CPT

## 2022-09-12 PROCEDURE — 36415 COLL VENOUS BLD VENIPUNCTURE: CPT

## 2022-09-12 PROCEDURE — 80076 HEPATIC FUNCTION PANEL: CPT

## 2022-09-12 RX ORDER — METOPROLOL TARTRATE 5 MG/5ML
5 INJECTION INTRAVENOUS ONCE
Status: CANCELLED | OUTPATIENT
Start: 2022-09-12 | End: 2022-09-12

## 2022-09-12 RX ORDER — SERTRALINE HYDROCHLORIDE 50 MG/1
100 TABLET, FILM COATED ORAL DAILY
Status: DISCONTINUED | OUTPATIENT
Start: 2022-09-13 | End: 2022-09-13 | Stop reason: HOSPADM

## 2022-09-12 RX ORDER — SUCRALFATE 1 G/1
1 TABLET ORAL
Status: CANCELLED | OUTPATIENT
Start: 2022-09-12

## 2022-09-12 RX ORDER — FLECAINIDE ACETATE 100 MG/1
100 TABLET ORAL EVERY 12 HOURS
Status: CANCELLED | OUTPATIENT
Start: 2022-09-12

## 2022-09-12 RX ORDER — INDOMETHACIN 25 MG/1
50 CAPSULE ORAL
Status: CANCELLED | OUTPATIENT
Start: 2022-09-12

## 2022-09-12 RX ORDER — METOPROLOL TARTRATE 5 MG/5ML
5 INJECTION INTRAVENOUS ONCE
Status: COMPLETED | OUTPATIENT
Start: 2022-09-12 | End: 2022-09-12

## 2022-09-12 RX ORDER — TEMAZEPAM 15 MG/1
15 CAPSULE ORAL
Status: CANCELLED | OUTPATIENT
Start: 2022-09-12

## 2022-09-12 RX ORDER — SUCRALFATE 1 G/1
1 TABLET ORAL
Status: DISCONTINUED | OUTPATIENT
Start: 2022-09-12 | End: 2022-09-13 | Stop reason: HOSPADM

## 2022-09-12 RX ORDER — MAGNESIUM SULFATE HEPTAHYDRATE 40 MG/ML
2 INJECTION, SOLUTION INTRAVENOUS ONCE
Status: COMPLETED | OUTPATIENT
Start: 2022-09-12 | End: 2022-09-13

## 2022-09-12 RX ORDER — METFORMIN HYDROCHLORIDE 750 MG/1
750 TABLET, EXTENDED RELEASE ORAL 2 TIMES DAILY WITH MEALS
Status: CANCELLED | OUTPATIENT
Start: 2022-09-12

## 2022-09-12 RX ORDER — ATORVASTATIN CALCIUM 40 MG/1
40 TABLET, FILM COATED ORAL
Status: CANCELLED | OUTPATIENT
Start: 2022-09-12

## 2022-09-12 RX ORDER — METOPROLOL TARTRATE 5 MG/5ML
INJECTION INTRAVENOUS
Status: COMPLETED
Start: 2022-09-12 | End: 2022-09-12

## 2022-09-12 RX ORDER — METOPROLOL TARTRATE 50 MG/1
50 TABLET ORAL 2 TIMES DAILY
Status: DISCONTINUED | OUTPATIENT
Start: 2022-09-12 | End: 2022-09-13 | Stop reason: HOSPADM

## 2022-09-12 RX ORDER — METOPROLOL TARTRATE 5 MG/5ML
5 INJECTION INTRAVENOUS ONCE
Status: DISCONTINUED | OUTPATIENT
Start: 2022-09-12 | End: 2022-09-12

## 2022-09-12 RX ORDER — SODIUM CHLORIDE 9 MG/ML
75 INJECTION, SOLUTION INTRAVENOUS CONTINUOUS
Status: DISCONTINUED | OUTPATIENT
Start: 2022-09-12 | End: 2022-09-13

## 2022-09-12 RX ORDER — PANTOPRAZOLE SODIUM 40 MG/1
40 TABLET, DELAYED RELEASE ORAL
Status: CANCELLED | OUTPATIENT
Start: 2022-09-12

## 2022-09-12 RX ORDER — SERTRALINE HYDROCHLORIDE 50 MG/1
100 TABLET, FILM COATED ORAL DAILY
Status: CANCELLED | OUTPATIENT
Start: 2022-09-13

## 2022-09-12 RX ORDER — TRAZODONE HYDROCHLORIDE 50 MG/1
50 TABLET ORAL 2 TIMES DAILY
Status: DISCONTINUED | OUTPATIENT
Start: 2022-09-12 | End: 2022-09-13 | Stop reason: HOSPADM

## 2022-09-12 RX ORDER — ATORVASTATIN CALCIUM 40 MG/1
40 TABLET, FILM COATED ORAL
Status: DISCONTINUED | OUTPATIENT
Start: 2022-09-12 | End: 2022-09-13 | Stop reason: HOSPADM

## 2022-09-12 RX ORDER — FLECAINIDE ACETATE 100 MG/1
100 TABLET ORAL EVERY 12 HOURS
Status: DISCONTINUED | OUTPATIENT
Start: 2022-09-12 | End: 2022-09-13

## 2022-09-12 RX ORDER — ADHESIVE BANDAGE
30 BANDAGE TOPICAL DAILY PRN
Status: DISCONTINUED | OUTPATIENT
Start: 2022-09-12 | End: 2022-09-13 | Stop reason: HOSPADM

## 2022-09-12 RX ORDER — METFORMIN HYDROCHLORIDE 750 MG/1
750 TABLET, EXTENDED RELEASE ORAL 2 TIMES DAILY WITH MEALS
Status: DISCONTINUED | OUTPATIENT
Start: 2022-09-12 | End: 2022-09-13 | Stop reason: HOSPADM

## 2022-09-12 RX ORDER — TEMAZEPAM 15 MG/1
15 CAPSULE ORAL
Status: DISCONTINUED | OUTPATIENT
Start: 2022-09-12 | End: 2022-09-13 | Stop reason: HOSPADM

## 2022-09-12 RX ORDER — TRAZODONE HYDROCHLORIDE 50 MG/1
50 TABLET ORAL 2 TIMES DAILY
Status: CANCELLED | OUTPATIENT
Start: 2022-09-12

## 2022-09-12 RX ORDER — METOPROLOL TARTRATE 25 MG/1
25 TABLET, FILM COATED ORAL 2 TIMES DAILY
Status: CANCELLED | OUTPATIENT
Start: 2022-09-12

## 2022-09-12 RX ORDER — OLANZAPINE 2.5 MG/1
2.5 TABLET ORAL
Status: DISCONTINUED | OUTPATIENT
Start: 2022-09-12 | End: 2022-09-13 | Stop reason: HOSPADM

## 2022-09-12 RX ORDER — ADHESIVE BANDAGE
30 BANDAGE TOPICAL DAILY PRN
Status: CANCELLED | OUTPATIENT
Start: 2022-09-12

## 2022-09-12 RX ORDER — FLECAINIDE ACETATE 100 MG/1
100 TABLET ORAL EVERY 12 HOURS
Status: DISCONTINUED | OUTPATIENT
Start: 2022-09-12 | End: 2022-09-12

## 2022-09-12 RX ORDER — INDOMETHACIN 25 MG/1
50 CAPSULE ORAL
Status: DISCONTINUED | OUTPATIENT
Start: 2022-09-12 | End: 2022-09-13 | Stop reason: HOSPADM

## 2022-09-12 RX ORDER — FLECAINIDE ACETATE 100 MG/1
50 TABLET ORAL ONCE
Status: COMPLETED | OUTPATIENT
Start: 2022-09-12 | End: 2022-09-12

## 2022-09-12 RX ORDER — DIPHENHYDRAMINE HYDROCHLORIDE 50 MG/ML
25 INJECTION, SOLUTION INTRAMUSCULAR; INTRAVENOUS
Status: DISCONTINUED | OUTPATIENT
Start: 2022-09-12 | End: 2022-09-13 | Stop reason: HOSPADM

## 2022-09-12 RX ORDER — ALLOPURINOL 100 MG/1
300 TABLET ORAL DAILY
Status: CANCELLED | OUTPATIENT
Start: 2022-09-13

## 2022-09-12 RX ORDER — PANTOPRAZOLE SODIUM 40 MG/1
40 TABLET, DELAYED RELEASE ORAL
Status: DISCONTINUED | OUTPATIENT
Start: 2022-09-12 | End: 2022-09-13 | Stop reason: HOSPADM

## 2022-09-12 RX ORDER — ACETAMINOPHEN 325 MG/1
650 TABLET ORAL
Status: DISCONTINUED | OUTPATIENT
Start: 2022-09-12 | End: 2022-09-13 | Stop reason: HOSPADM

## 2022-09-12 RX ORDER — DIPHENHYDRAMINE HYDROCHLORIDE 50 MG/ML
25 INJECTION, SOLUTION INTRAMUSCULAR; INTRAVENOUS
Status: CANCELLED | OUTPATIENT
Start: 2022-09-12

## 2022-09-12 RX ORDER — ACETAMINOPHEN 325 MG/1
650 TABLET ORAL
Status: CANCELLED | OUTPATIENT
Start: 2022-09-12

## 2022-09-12 RX ORDER — METOPROLOL TARTRATE 25 MG/1
25 TABLET, FILM COATED ORAL 2 TIMES DAILY
Status: DISCONTINUED | OUTPATIENT
Start: 2022-09-12 | End: 2022-09-12

## 2022-09-12 RX ORDER — OLANZAPINE 2.5 MG/1
2.5 TABLET ORAL
Status: CANCELLED | OUTPATIENT
Start: 2022-09-12

## 2022-09-12 RX ORDER — ALLOPURINOL 100 MG/1
300 TABLET ORAL DAILY
Status: DISCONTINUED | OUTPATIENT
Start: 2022-09-13 | End: 2022-09-13 | Stop reason: HOSPADM

## 2022-09-12 RX ADMIN — SODIUM CHLORIDE 75 ML/HR: 9 INJECTION, SOLUTION INTRAVENOUS at 15:25

## 2022-09-12 RX ADMIN — TEMAZEPAM 15 MG: 15 CAPSULE ORAL at 21:13

## 2022-09-12 RX ADMIN — METOPROLOL TARTRATE 5 MG: 1 INJECTION, SOLUTION INTRAVENOUS at 15:27

## 2022-09-12 RX ADMIN — SODIUM CHLORIDE 250 ML: 9 INJECTION, SOLUTION INTRAVENOUS at 17:57

## 2022-09-12 RX ADMIN — ALLOPURINOL 300 MG: 100 TABLET ORAL at 08:51

## 2022-09-12 RX ADMIN — FLECAINIDE ACETATE 100 MG: 100 TABLET ORAL at 21:13

## 2022-09-12 RX ADMIN — MAGNESIUM SULFATE HEPTAHYDRATE 2 G: 40 INJECTION, SOLUTION INTRAVENOUS at 15:27

## 2022-09-12 RX ADMIN — METFORMIN HYDROCHLORIDE 750 MG: 750 TABLET, EXTENDED RELEASE ORAL at 08:51

## 2022-09-12 RX ADMIN — SUCRALFATE 1 G: 1 TABLET ORAL at 21:13

## 2022-09-12 RX ADMIN — METFORMIN HYDROCHLORIDE 750 MG: 750 TABLET, EXTENDED RELEASE ORAL at 17:50

## 2022-09-12 RX ADMIN — PANTOPRAZOLE SODIUM 40 MG: 40 TABLET, DELAYED RELEASE ORAL at 05:51

## 2022-09-12 RX ADMIN — APIXABAN 5 MG: 5 TABLET, FILM COATED ORAL at 21:13

## 2022-09-12 RX ADMIN — FLECAINIDE ACETATE 50 MG: 100 TABLET ORAL at 16:15

## 2022-09-12 RX ADMIN — ATORVASTATIN CALCIUM 40 MG: 40 TABLET, FILM COATED ORAL at 21:13

## 2022-09-12 RX ADMIN — METOPROLOL TARTRATE 50 MG: 50 TABLET, FILM COATED ORAL at 17:43

## 2022-09-12 RX ADMIN — METOPROLOL TARTRATE 5 MG: 1 INJECTION, SOLUTION INTRAVENOUS at 12:00

## 2022-09-12 RX ADMIN — SUCRALFATE 1 G: 1 TABLET ORAL at 08:50

## 2022-09-12 RX ADMIN — FLECAINIDE ACETATE 100 MG: 100 TABLET ORAL at 08:51

## 2022-09-12 RX ADMIN — METOPROLOL TARTRATE 5 MG: 1 INJECTION, SOLUTION INTRAVENOUS at 12:12

## 2022-09-12 RX ADMIN — METOPROLOL TARTRATE 25 MG: 25 TABLET, FILM COATED ORAL at 11:25

## 2022-09-12 RX ADMIN — SUCRALFATE 1 G: 1 TABLET ORAL at 17:42

## 2022-09-12 RX ADMIN — PANTOPRAZOLE SODIUM 40 MG: 40 TABLET, DELAYED RELEASE ORAL at 17:45

## 2022-09-12 RX ADMIN — SUCRALFATE 1 G: 1 TABLET ORAL at 11:25

## 2022-09-12 RX ADMIN — TRAZODONE HYDROCHLORIDE 50 MG: 50 TABLET ORAL at 17:43

## 2022-09-12 RX ADMIN — METOPROLOL TARTRATE 25 MG: 25 TABLET, FILM COATED ORAL at 08:51

## 2022-09-12 RX ADMIN — APIXABAN 5 MG: 5 TABLET, FILM COATED ORAL at 08:51

## 2022-09-12 RX ADMIN — ACETAMINOPHEN 650 MG: 325 TABLET, FILM COATED ORAL at 07:21

## 2022-09-12 RX ADMIN — SERTRALINE HYDROCHLORIDE 100 MG: 50 TABLET ORAL at 08:50

## 2022-09-12 RX ADMIN — TRAZODONE HYDROCHLORIDE 50 MG: 50 TABLET ORAL at 21:13

## 2022-09-12 NOTE — PROGRESS NOTES
Discussed with Cardiologist Dr Eri Norman and Electrophysiologist Dr Shruthi Barajas (patient's Electrophysiologist). Recommend increasing flecainide to 150 mg BID oral  Recommend increasing metoprolol to 50 mg oral BID   No need for electrical cardioversion at this time. Patient is ok to be at [facility] in PCU and does not need transfer to other hospital at this time. Dr Shruthi Barajas (on speaker phone) and I have also discussed with patient and his wife at bedside. They have been updated with plan of care.

## 2022-09-12 NOTE — BH NOTES
Psychiatric Progress Note    CHIEF COMPLAINT:  \"I'm OK\"     LENGTH OF STAY:  2 days     INTERVAL HISTORY:  Irene Fletcher states that he is doing ok. He endorses that he had an anger outburst at home and is trying to figure out what happened. He endorses decreased sleep due to the bed being uncomfortable. He denies SI/HI/AVH currently. He states that he is eating well. He endorses looking forward to going home. 9/11-Matthew states that he is doing well. He denies SI/HI/AVH currently. He states that he is eating and sleeping well. He endorses tolerating the medications. He denies any side effects. He denies any questions or concerns. 9/12 - Sachi Evans reports feeling dizzy and fell out this morning. Could not state his mood initially, however presents as anxious and confused. Denies SI/HI/AH/VH. No aggression or violence. Appropriately interactive and aware. Tolerating medications well. Eating and sleeping fairly. VITALS:  Patient Vitals for the past 24 hrs:    Temp Pulse Resp BP SpO2   09/10/22 1638 -- 62 -- 114/71 --   09/10/22 0818 97.5 °F (36.4 °C) 62 18 127/67 99 %   09/09/22 2030 98.1 °F (36.7 °C) 68 16 126/76 99 %      LABS:     Recent Results   No results found for this or any previous visit (from the past 24 hour(s)).          MEDICATIONS:     Current Facility-Administered Medications:     traZODone (DESYREL) tablet 50 mg, 50 mg, Oral, BID, Salima Nayan A, NP, 50 mg at 09/10/22 1639    metFORMIN ER (GLUCOPHAGE XR) tablet 750 mg, 750 mg, Oral, BID WITH MEALS, Salima Nayan A, NP, 750 mg at 09/10/22 1638    temazepam (RESTORIL) capsule 15 mg, 15 mg, Oral, QHS, Salima Nayan A, NP, 15 mg at 09/09/22 2137    indomethacin (INDOCIN) capsule 50 mg, 50 mg, Oral, TID PRN, Slaima Nayan A, NP, 50 mg at 09/09/22 1819    acetaminophen (TYLENOL) tablet 650 mg, 650 mg, Oral, Q4H PRN, Basir, Nasr, NP, 650 mg at 09/10/22 1638    sucralfate (CARAFATE) tablet 1 g, 1 g, Oral, AC&HS, Agueda Osorio NP, 1 g at 09/10/22 1638    pantoprazole (PROTONIX) tablet 40 mg, 40 mg, Oral, ACB&D, Basir, Nasr, NP, 40 mg at 09/10/22 1704    flecainide (TAMBOCOR) tablet 100 mg, 100 mg, Oral, Q12H, Basir, Nasr, NP, 100 mg at 09/10/22 0829    allopurinoL (ZYLOPRIM) tablet 300 mg, 300 mg, Oral, DAILY, Basir, Nasr, NP, 300 mg at 09/10/22 0829    metoprolol tartrate (LOPRESSOR) tablet 25 mg, 25 mg, Oral, BID, Basir, Nasr, NP, 25 mg at 09/10/22 1638    sertraline (ZOLOFT) tablet 100 mg, 100 mg, Oral, DAILY, Basir, Nasr, NP, 100 mg at 09/10/22 0829    atorvastatin (LIPITOR) tablet 40 mg, 40 mg, Oral, QHS, Melbair, Mor, NP, 40 mg at 09/09/22 2137    OLANZapine (ZyPREXA) tablet 2.5 mg, 2.5 mg, Oral, Q6H PRN, Agueda Osorio NP    diphenhydrAMINE (BENADRYL) injection 25 mg, 25 mg, IntraMUSCular, BID PRN, Nick Salas NP    magnesium hydroxide (MILK OF MAGNESIA) 400 mg/5 mL oral suspension 30 mL, 30 mL, Oral, DAILY PRN, Agueda Osorio NP    apixaban (ELIQUIS) tablet 5 mg, 5 mg, Oral, Q12H, Janis Mayfield MD, 5 mg at 09/10/22 1771      DIAGNOSIS:  Unspecified Mood disorder     PLAN:  Continue current medications. Code called. Dispo planning per case management. I certify that this patient's inpatient psychiatric hospital services furnished since the previous certification were, and continue to be, required for treatment that could reasonably be expected to improve the patient's condition, or for diagnostic study, and that the patient continues to need, on a daily basis, active treatment furnished directly by or requiring the supervision of inpatient psychiatric facility personnel. In addition, the hospital records show that services furnished were intensive treatment services, admission or related services, or equivalent services.

## 2022-09-12 NOTE — PROGRESS NOTES
Rapid Response 1035am  Per nurse on BHU reported that the patient came out of the room, didn't look good & went down to the floor. Never LOS. Patient was alert & oriented when arrived to the unit. Pulse high 130's. ECG completed. Lab & IV completed. Dr. Nelida Escalona on unit. Patient will be transferred to PCU.

## 2022-09-12 NOTE — PROGRESS NOTES
We are asked to see regarding atrial fibrillation with moderate ventricular response. Chart labs and remaining data have been reviewed and I have discussed this case with the hospitalist.    The patient has a history of paroxysmal A. fib with several ablations and cardioversions last about a month ago with Dr. Mari Jaime and [facility] doctors. He is on appropriate medical therapy including flecainide and Eliquis. This morning he was in the bathroom apparently strained having a bowel movement and when he came out he was pale and diaphoretic and sort of slumped to the ground and was found to be tachycardic but not hypotensive. EKG was obtained which showed atrial fibrillation with a heart rate of about 129 bpm and blood pressure is remained stable. He apparently still feels a little fuzzy headed and intermittently sweaty but no symptoms suggestive of angina or heart failure. He has been given some doses of IV metoprolol which his blood pressure is tolerated but he remains in A. fib. I would continue oral or IV metoprolol as long as blood pressure will allow and increase his flecainide to 150 mg twice a day. It sounds like he has not missed any medication doses except for perhaps on the eighth of this month when he was being evaluated in the emergency room at [facility] prior to transfer here to [facility].   I suspect he will convert on his own but if he does not we may need to consider cardioversion or further rate control and a referral back to his usual electrophysiologist.

## 2022-09-12 NOTE — H&P
Hospitalist Admission Note    NAME: Naresh Worrell   :     MRN:  [id number]   Room Number: TDE9/85  @ [facility]     Date/Time:  2022 3:36 PM    Patient PCP: Sri Curiel MD  ______________________________________________________________________  Please note that this dictation was completed with "[employer] the computer voice recognition software. Quite often unanticipated grammatical, syntax, homophones, and other interpretive errors are inadvertently transcribed by the computer software. Please disregard these errors. Please excuse any errors that have escaped final proofreading. Given the patient's current clinical presentation, I have a high level of concern for decompensation if discharged from the emergency department. Complex decision making was performed, which includes reviewing the patient's available past medical records, laboratory results, and x-ray films. My assessment of this patient's clinical condition and my plan of care is as follows. Assessment / Plan:    Atrial fibrillation with rapid ventricular response POA   Lightheadedness POA   Hyperlipidemia POA  Hypertension POA  EKG interpreted independently - Afib with RVR, discussed with Dr Andres Han and Dr Hans Silva, electrophysiologist.   - increase Flecainide to 150 mg BID  - Increase metoprolol to 50 mg BID  - Continue PRN metoprolol  - IV hydration   - Does not need electrical cardioversion at this time        Mood disorder POA   Suicidal ideation POA  - no current SI/HI. - Psychiatry consult, Dr Andrea Lynn will see patient tomorrow. - Confirmed with Dr. Andrea Lynn, does not need suicide precautions or sitter. - continue sertraline, trazodone. Elevated creatinine POA  Baseline 0.9-1.1. suspect due to rapid afib and decreased perfusion. Leukocytosis POA  Suspect stress response. Eosinophils 20%.    Will  monitor     GERD POA  - continue PPI        There is no height or weight on file to calculate BMI. Code Status: Full   Surrogate Decision Maker:  Wife     DVT Prophylaxis:  Apixaban  GI Prophylaxis: not indicated  Baseline: ambulatory independently         Subjective:   CHIEF COMPLAINT: lightheadedness     HISTORY OF PRESENT ILLNESS:     Spenser Easley is a 77 y.o.  male with PMH of Atrial fibrillation, depression, diabetes, gout, HTN, Insomnia, Sleep apnea who presents as a transfer from [facility] due to lightheadedness. Patient was admitted to [address] [place] [address] on 9/8 due to suicidal ideation and anger outburst.  RRT called at 10:35 AM for lightheadedness, collapse. Noted patient to be flushed, diaphoretic, sitting on the ground. Reporting feeling that his head feels funny. Per RN Supervisor and [address] [place] [address] Staff, patient reportedly used the bathroom, came out and 'didn't look' good and collapsed. Patient states he was told he was not feeling well this morning, was told his HR was high, he strained to have a bowel movement, felt a rush to his head and felt like he would pass out so he walked to the hallway and collapsed. Patient reports mild chest tightness but does not have chest pain shortness of breath or palpitations. He has history of Atrial fibrillation has apparently had 7 Cardioversions and 3 Ablation, most recently 1 month ago with Dr Ratna Craig at [facility]. He  is worried about having missed a dose of Apixaban when he was in the ER. We were asked to admit for work up and evaluation of the above problems.      Past Medical History:   Diagnosis Date    Arrhythmia     A- FLUTTER  A-FIB CARDIOVERSION 8/2021, CARDIAC ABLATION 2018, 2020    COVID-19 vaccine series completed     Depression     Diabetes ([facility] [address]     Gout     Hypertension     Insomnia     Sleep apnea     CPAP        Past Surgical History:   Procedure Laterality Date    HX COLONOSCOPY      HX KNEE REPLACEMENT Right 2007    NEUROLOGICAL PROCEDURE UNLISTED  1997    LUMBAR 4-5 DISCECTOMY    [place] CARDIAC SURG PROCEDURE UNLIST  2018, 2020    CARDIAC ABLATION    [place] CARDIAC SURG PROCEDURE UNLIST  08/2021    CARDIOVERSION X 4 OR 5       Social History     Tobacco Use    Smoking status: Never    Smokeless tobacco: Never   Substance Use Topics    Alcohol use: Not Currently        Family History   Problem Relation Age of Onset    Diabetes Mother     Dementia Mother     Hypertension Mother     Diabetes Father     Hypertension Father     Diabetes Sister     Diabetes Brother     Kidney Disease Brother     Heart Disease Maternal Grandfather     Diabetes Sister     Diabetes Sister     Anesth Problems Neg Hx      Allergies   Allergen Reactions    Azithromycin Other (comments)     Patient has a.fib and it \"threw off\" his heart rhythm. Erythromycin Rash    Pcn [Penicillins] Rash     AS CHILD. PT DOES NOT KNOW SEVERITY          Prior to Admission medications    Medication Sig Start Date End Date Taking? Authorizing Provider   flecainide (TAMBOCOR) 100 mg tablet Take 100 mg by mouth every twelve (12) hours. Yes Provider, Historical   metoprolol tartrate (LOPRESSOR) 25 mg tablet Take  by mouth two (2) times a day. Yes Provider, Historical   sertraline (ZOLOFT) 100 mg tablet Take 100 mg by mouth daily. Yes Provider, Historical   pantoprazole (PROTONIX) 40 mg tablet Take 40 mg by mouth Before breakfast and dinner. Yes Provider, Historical   sucralfate (CARAFATE) 1 gram tablet Take 1 g by mouth Before breakfast, lunch, dinner and at bedtime. Yes Provider, Historical   dulaglutide (Trulicity) 0.23 SM/2.4 mL sub-q pen 0.75 mg by SubCUTAneous route every Sunday. Yes Provider, Historical   metFORMIN ER (GLUCOPHAGE XR) 750 mg tablet Take 750 mg by mouth two (2) times a day. Yes Provider, Historical   allopurinoL (ZYLOPRIM) 300 mg tablet Take 300 mg by mouth nightly. Yes Provider, Historical   simvastatin (ZOCOR) 40 mg tablet Take 40 mg by mouth nightly.    Yes Provider, Historical   apixaban (ELIQUIS) 5 mg tablet Take 5 mg by mouth two (2) times a day. Yes Provider, Historical   empagliflozin (JARDIANCE) 25 mg tablet Take 25 mg by mouth Every morning. Yes Provider, Historical   traZODone (DESYREL) 50 mg tablet Take 50 mg by mouth two (2) times a day. Take 50 mg by mouth at 5 pm and 8 pm   Yes Provider, Historical   temazepam (RESTORIL) 15 mg capsule Take 15 mg by mouth nightly. Yes Provider, Historical   ascorbic acid, vitamin C, (VITAMIN C) 500 mg tablet Take 1,000 mg by mouth daily. Yes Provider, Historical   multivitamin (ONE A DAY) tablet Take 1 Tablet by mouth daily. Yes Provider, Historical       REVIEW OF SYSTEMS:     I am not able to complete the review of systems because:    The patient is intubated and sedated    The patient has altered mental status due to his acute medical problems    The patient has baseline aphasia from prior stroke(s)    The patient has baseline dementia and is not reliable historian    The patient is in acute medical distress and unable to provide information           Total of 12 systems reviewed as follows:       POSITIVE= underlined text  Negative = text not underlined  General:  fever, chills, sweats, generalized weakness, weight loss/gain,      loss of appetite   Eyes:    blurred vision, eye pain, loss of vision, double vision  ENT:    rhinorrhea, pharyngitis   Respiratory:   cough, sputum production, SOB, CARRERA, wheezing, pleuritic pain   Cardiology:   chest pain, palpitations, orthopnea, PND, edema, syncope   Gastrointestinal:  abdominal pain , N/V, diarrhea, dysphagia, constipation, bleeding   Genitourinary:  frequency, urgency, dysuria, hematuria, incontinence   Muskuloskeletal :  arthralgia, myalgia, back pain  Hematology:  easy bruising, nose or gum bleeding, lymphadenopathy   Dermatological: rash, ulceration, pruritis, color change / jaundice  Endocrine:   hot flashes or polydipsia   Neurological:  headache, dizziness, confusion, focal weakness, paresthesia,     Speech difficulties, memory loss, gait difficulty  Psychological: Feelings of anxiety, depression, agitation    Objective:   VITALS:    Visit Vitals  BP (!) 150/104 (BP 1 Location: Right arm, BP Patient Position: At rest)   Pulse (!) 129   Temp 97.9 °F (36.6 °C)   Resp 18   SpO2 96%       PHYSICAL EXAM:    General:    Alert, cooperative, no distress, appears stated age. HEENT: Atraumatic, anicteric sclerae, pink conjunctivae     No oral ulcers, mucosa moist, throat clear, dentition fair  Neck:  Supple, symmetrical,  thyroid: non tender  Lungs:   Clear to auscultation bilaterally. No Wheezing or Rhonchi. No rales. Chest wall:  No tenderness  No Accessory muscle use. Heart:   IrRegular  rhythm,  No  murmur   No edema  Abdomen:   Soft, non-tender. Not distended. Bowel sounds normal  Extremities: No cyanosis. No clubbing,      Skin turgor normal, Capillary refill normal, Radial dial pulse 2+  Skin:     Not pale. Not Jaundiced  No rashes   Psych:  Good insight. Not depressed. Not anxious or agitated. Neurologic: EOMs intact. No facial asymmetry. No aphasia or slurred speech. Symmetrical strength, Sensation grossly intact. Alert and oriented X 4.     ______________________________________________________________________    Care Plan discussed with:  Patient/Family, Nurse, , and Consultant Dr Ja Ramires and Dr Zachary Dennison    Expected  Disposition:  Home w/Family  ________________________________________________________________________  TOTAL TIME:  90Minutes    CRITICAL CARE WAS PERFORMED FOR THIS ENCOUNTER: YES. I had a face to face encounter with the patient, reviewed and interpreted patient data including clinical events, labs, images, vital signs, I/O's, and examined patient. I have discussed the case and the plan and management of the patient's care with the consulting services, the bedside nurses and necessary ancillary providers.   This patient has a high probability of imminent, clinically significant deterioration, which requires the highest level of preparedness to intervene urgently. I participated in the decision-making and personally managed or directed the management of the following life and organ supporting interventions that required my frequent assessment to treat or prevent imminent deterioration. I personally spent 55  minutes of critical care time. This is time spent at this critically ill patient's bedside actively involved in patient care as well as the coordination of care and discussions with the patient's family. This does not include any procedural time which has been billed separately. Comments    x Reviewed previous records   >50% of visit spent in counseling and coordination of care x Discussion with patient and/or family and questions answered       ________________________________________________________________________  Signed: Sheree Aschoff, MD    Procedures: see electronic medical records for all procedures/Xrays and details which were not copied into this note but were reviewed prior to creation of Plan. LAB DATA REVIEWED:    Recent Results (from the past 24 hour(s))   EKG, 12 LEAD, SUBSEQUENT    Collection Time: 09/12/22  9:37 AM   Result Value Ref Range    Ventricular Rate 133 BPM    Atrial Rate 300 BPM    QRS Duration 90 ms    Q-T Interval 294 ms    QTC Calculation (Bezet) 437 ms    Calculated R Axis 77 degrees    Calculated T Axis 53 degrees    Diagnosis       ** Age and gender specific ECG analysis **  Possible  Atrial fibrillation  Anteroseptal infarct (cited on or before 15-MAR-2022)  When compared with ECG of 15-MAR-2022 10:20,  Vent.  rate has increased BY  52 BPM  Questionable change in initial forces of Anterior leads  Possible  Atrial fibrillation  is a new finding  Confirmed by Ele Orozco M.D., Dahiana De ([id number]) on 9/12/2022 1:24:23 PM     GLUCOSE, POC    Collection Time: 09/12/22 10:37 AM   Result Value Ref Range    Glucose (POC) 109 65 - 117 mg/dL    Performed by Yadira Garcia RN    SAMPLES BEING HELD    Collection Time: 09/12/22 10:50 AM   Result Value Ref Range    SAMPLES BEING HELD 2LAV,1GREEN,1PST,1RED,1SST     COMMENT        Add-on orders for these samples will be processed based on acceptable specimen integrity and analyte stability, which may vary by analyte. METABOLIC PANEL, BASIC    Collection Time: 09/12/22 10:50 AM   Result Value Ref Range    Sodium 140 136 - 145 mmol/L    Potassium 4.1 3.5 - 5.1 mmol/L    Chloride 104 97 - 108 mmol/L    CO2 31 21 - 32 mmol/L    Anion gap 5 5 - 15 mmol/L    Glucose 107 (H) 65 - 100 mg/dL    BUN 22 (H) 6 - 20 MG/DL    Creatinine 1.34 (H) 0.70 - 1.30 MG/DL    BUN/Creatinine ratio 16 12 - 20      GFR est AA >60 >60 ml/min/1.73m2    GFR est non-AA 53 (L) >60 ml/min/1.73m2    Calcium 9.5 8.5 - 10.1 MG/DL   MAGNESIUM    Collection Time: 09/12/22 10:50 AM   Result Value Ref Range    Magnesium 1.7 1.6 - 2.4 mg/dL   PHOSPHORUS    Collection Time: 09/12/22 10:50 AM   Result Value Ref Range    Phosphorus 3.6 2.6 - 4.7 MG/DL   HEPATIC FUNCTION PANEL    Collection Time: 09/12/22 10:50 AM   Result Value Ref Range    Protein, total 7.8 6.4 - 8.2 g/dL    Albumin 3.9 3.5 - 5.0 g/dL    Globulin 3.9 2.0 - 4.0 g/dL    A-G Ratio 1.0 (L) 1.1 - 2.2      Bilirubin, total 0.6 0.2 - 1.0 MG/DL    Bilirubin, direct 0.1 0.0 - 0.2 MG/DL    Alk.  phosphatase 63 45 - 117 U/L    AST (SGOT) 20 15 - 37 U/L    ALT (SGPT) 26 12 - 78 U/L   TROPONIN-HIGH SENSITIVITY    Collection Time: 09/12/22 10:50 AM   Result Value Ref Range    Troponin-High Sensitivity 44 0 - 76 ng/L

## 2022-09-12 NOTE — PROGRESS NOTES
RRT called approximately around 27:26 AM.   Per policy, RRT response team attended the rapid. I informed RN supervisor via text to inform me if I am needed at bedside immediately. 10:38 AM : informed by [address] [place] [address] Staff that I am needed at bedside    Noted patient to be flushed, diaphoretic, sitting on the ground. Reporting feeling that his head feels funny. Repeatedly apologizing. Per RN Supervisor and [address] [place] [address] Staff, patient reportedly used the bathroom, came out and 'didn't look' good and collapsed. Visit Vitals  BP (!) 142/64   Pulse (!) 132   Temp 97.5 °F (36.4 °C)   Resp 24   Ht 6' 1\" (1.854 m)   Wt 118.9 kg (262 lb 3.2 oz)   SpO2 100%   BMI 34.59 kg/m²             PHYSICAL EXAM:  General: Well developed, well nourished. Alert, cooperative, flushed, diaphoretic  EENT:  Normocephalic atraumatic. EOMI. Anicteric sclerae. MMM  Resp:  Clear to ausculation bilaterally, no wheezing or rales. No accessory muscle use  CV:  Regular rate, tachycardic, normal S1/S2, no murmurs, rubs, gallops. GI:  Soft, Non distended, Non tender, normoactive bowel sounds  Neurologic:  Alert and oriented X 3, no focal neurological deficits. Moving all extremities spontaneously   Psych:   Good insight. +anxious. Not agitated  Skin:  No rashes. No jaundice  Extremities:     DP 2+ symmetric, no edema. EKG interpreted independently :   Afib RVR, QTc 437 ms. Assessment/Plan :   Afib RVR  - Transfer to medicine PCU  - Full set of labs - CBC, CMP, HS Troponin  - Cardiology consult  - Metoprolol 5 mg IV once    CRITICAL CARE WAS PERFORMED FOR THIS ENCOUNTER: YES. I had a face to face encounter with the patient, reviewed and interpreted patient data including clinical events, labs, images, vital signs, I/O's, and examined patient. I have discussed the case and the plan and management of the patient's care with the consulting services, the bedside nurses and necessary ancillary providers.   This patient has a high probability of imminent, clinically significant deterioration, which requires the highest level of preparedness to intervene urgently. I participated in the decision-making and personally managed or directed the management of the following life and organ supporting interventions that required my frequent assessment to treat or prevent imminent deterioration. I personally spent 25  minutes of critical care time. This is time spent at this critically ill patient's bedside actively involved in patient care as well as the coordination of care and discussions with the patient's family. This does not include any procedural time which has been billed separately.   IMPENDING DETERIORATION - cardiovascular   ASSOCIATED RISK FACTORS - Dysrhythmia, Vascular Compromise  MANAGEMENT- Bedside Assessment and Supervision of Care  INTERPRETATION -  ECG, Blood Pressure, and Cardiac Output Measures   INTERVENTIONS - hemodynamic mngmt and Metobolic interventions  CASE REVIEW -  Nursing  TREATMENT RESPONSE - transfer   PERFORMED BY - Self

## 2022-09-12 NOTE — PROGRESS NOTES
[facility]: MED RECONCILIATION    Comments/Recommendations:   Patient transferred from [facility]. Medication interview was completed upon admission to [facility]. Of note, patient states pantoprazole and sucralfate were prescribed post-ablation with no refills. Patient states he has almost finished the course    Medications adjusted:  Trazodone to 50 mg    Information obtained from: Patient and RxQuery refill history    Prior to Admission Medications:   Prior to Admission Medications   Prescriptions Last Dose Informant Patient Reported? Taking?   allopurinoL (ZYLOPRIM) 300 mg tablet  Self Yes Yes   Sig: Take 300 mg by mouth nightly. apixaban (ELIQUIS) 5 mg tablet  Self Yes Yes   Sig: Take 5 mg by mouth two (2) times a day. ascorbic acid, vitamin C, (VITAMIN C) 500 mg tablet  Self Yes Yes   Sig: Take 1,000 mg by mouth daily. dulaglutide (Trulicity) 3.52 FM/9.9 mL sub-q pen  Self Yes Yes   Si.75 mg by SubCUTAneous route every . empagliflozin (JARDIANCE) 25 mg tablet  Self Yes Yes   Sig: Take 25 mg by mouth Every morning. flecainide (TAMBOCOR) 100 mg tablet  Self Yes Yes   Sig: Take 100 mg by mouth every twelve (12) hours. metFORMIN ER (GLUCOPHAGE XR) 750 mg tablet  Self Yes Yes   Sig: Take 750 mg by mouth two (2) times a day. metoprolol tartrate (LOPRESSOR) 25 mg tablet  Self Yes Yes   Sig: Take  by mouth two (2) times a day. multivitamin (ONE A DAY) tablet  Self Yes Yes   Sig: Take 1 Tablet by mouth daily. pantoprazole (PROTONIX) 40 mg tablet  Self Yes Yes   Sig: Take 40 mg by mouth Before breakfast and dinner. sertraline (ZOLOFT) 100 mg tablet  Self Yes Yes   Sig: Take 100 mg by mouth daily. simvastatin (ZOCOR) 40 mg tablet  Self Yes Yes   Sig: Take 40 mg by mouth nightly. sucralfate (CARAFATE) 1 gram tablet  Self Yes Yes   Sig: Take 1 g by mouth Before breakfast, lunch, dinner and at bedtime. temazepam (RESTORIL) 15 mg capsule  Self Yes Yes   Sig: Take 15 mg by mouth nightly.    traZODone (DESYREL) 50 mg tablet  Self Yes Yes   Sig: Take 50 mg by mouth two (2) times a day.  Take 50 mg by mouth at 5 pm and 8 pm        Thank you,  Jimmy Dumont, PharmD, BCPS  [phone]

## 2022-09-12 NOTE — PROGRESS NOTES
[employer] to Dr. Kira Yoon [phone]: BP 91/67 and . Should Metoprolol be held? Written order from Dr. Kira Yoon [phone]: Ordering fluid bolus 250 cc. Please give metoprolol.

## 2022-09-12 NOTE — PROGRESS NOTES
Patient received from [facility] [place] via stretcher. Patient experienced syncopal episode while on U and transported to PCU 2. On arrival, patient awake, anxious with a heart rate of 130 bpm. Patient denies chest pain. Patient placed on cardiac monitor. Dr. Otf Todd at bedside. 1125  5mg metoprolol IV given per order. 1200  Patient's heart rate remains 127-130 bpm, Dr. Otf Todd made aware. 1212  Metoprolol 5mg IV given for continued tachycardia with a rate in 120-130.    1445  Dr. Otf Todd made aware patient's heart rate continues in the 120's.

## 2022-09-12 NOTE — PROGRESS NOTES
Problem: Falls - Risk of  Goal: *Absence of Falls  Description: Document Lucy Albarado Fall Risk and appropriate interventions in the flowsheet. Outcome: Progressing Towards Goal  Note: Fall Risk Interventions:            Medication Interventions: Teach patient to arise slowly                   Problem: Altered Thought Process (Adult/Pediatric)  Goal: *STG: Participates in treatment plan  Outcome: Progressing Towards Goal  Goal: *STG: Remains safe in hospital  Outcome: Progressing Towards Goal  Goal: *STG: Complies with medication therapy  Outcome: Progressing Towards Goal  Goal: *STG: Demonstrates ability to understand and use improved judgment in daily activities and relationships  Outcome: Progressing Towards Goal     [id number]: Assumed care of patient after receiving shift report from outgoing nurse. Patient was out and visible on unit, interacting appropriately with peers and staff. Affect is smiling, mood is euthymic. Pt cooperative with vitals and assessment. A&O x 4. Independent in ADLs. Insight and concentration present. Gait is steady. Appetite patterns WNL. Denies AVH/SI/HI/Anxiety/Depression. Pt received PRN tylenol at [facility] for lower back pain. Patient medication and diet compliant. Pt encouraged to continue to participate in care. 6595-3499: Pt resting quietly in bed. LOS while on c-pap maintained throughout shift. No further issues noted at this time. 4917-3173: Pt has been observed throughout the night. Total hours slept approximately 7.25. No s/s of distress noted. Patient has remained safe. Hourly rounding performed throughout shift.

## 2022-09-13 ENCOUNTER — HOSPITAL ENCOUNTER (OUTPATIENT)
Age: 67
Setting detail: OBSERVATION
Discharge: HOME | End: 2022-09-14
Attending: FAMILY MEDICINE | Admitting: FAMILY MEDICINE
Payer: COMMERCIAL

## 2022-09-13 VITALS
OXYGEN SATURATION: 93 % | TEMPERATURE: 98 F | RESPIRATION RATE: 18 BRPM | SYSTOLIC BLOOD PRESSURE: 113 MMHG | HEART RATE: 125 BPM | DIASTOLIC BLOOD PRESSURE: 71 MMHG

## 2022-09-13 LAB
ALBUMIN SERPL-MCNC: 3.4 G/DL (ref 3.5–5)
ALBUMIN/GLOB SERPL: 1 {RATIO} (ref 1.1–2.2)
ALP SERPL-CCNC: 58 U/L (ref 45–117)
ALT SERPL-CCNC: 18 U/L (ref 12–78)
ANION GAP SERPL CALC-SCNC: 8 MMOL/L (ref 5–15)
AST SERPL-CCNC: 17 U/L (ref 15–37)
BASOPHILS # BLD: 0.1 K/UL (ref 0–0.1)
BASOPHILS NFR BLD: 1 % (ref 0–1)
BILIRUB DIRECT SERPL-MCNC: 0.1 MG/DL (ref 0–0.2)
BILIRUB SERPL-MCNC: 0.5 MG/DL (ref 0.2–1)
BUN SERPL-MCNC: 18 MG/DL (ref 6–20)
BUN/CREAT SERPL: 16 (ref 12–20)
CALCIUM SERPL-MCNC: 8.6 MG/DL (ref 8.5–10.1)
CHLORIDE SERPL-SCNC: 106 MMOL/L (ref 97–108)
CO2 SERPL-SCNC: 26 MMOL/L (ref 21–32)
CREAT SERPL-MCNC: 1.13 MG/DL (ref 0.7–1.3)
DIFFERENTIAL METHOD BLD: ABNORMAL
EOSINOPHIL # BLD: 1.4 K/UL (ref 0–0.4)
EOSINOPHIL NFR BLD: 12 % (ref 0–7)
ERYTHROCYTE [DISTWIDTH] IN BLOOD BY AUTOMATED COUNT: 15.5 % (ref 11.5–14.5)
GLOBULIN SER CALC-MCNC: 3.5 G/DL (ref 2–4)
GLUCOSE SERPL-MCNC: 116 MG/DL (ref 65–100)
HCT VFR BLD AUTO: 48.1 % (ref 36.6–50.3)
HGB BLD-MCNC: 15.7 G/DL (ref 12.1–17)
IMM GRANULOCYTES # BLD AUTO: 0.1 K/UL (ref 0–0.04)
IMM GRANULOCYTES NFR BLD AUTO: 1 % (ref 0–0.5)
LYMPHOCYTES # BLD: 2.9 K/UL (ref 0.8–3.5)
LYMPHOCYTES NFR BLD: 25 % (ref 12–49)
MAGNESIUM SERPL-MCNC: 1.9 MG/DL (ref 1.6–2.4)
MCH RBC QN AUTO: 28.3 PG (ref 26–34)
MCHC RBC AUTO-ENTMCNC: 32.6 G/DL (ref 30–36.5)
MCV RBC AUTO: 86.7 FL (ref 80–99)
MONOCYTES # BLD: 1 K/UL (ref 0–1)
MONOCYTES NFR BLD: 9 % (ref 5–13)
NEUTS SEG # BLD: 5.9 K/UL (ref 1.8–8)
NEUTS SEG NFR BLD: 52 % (ref 32–75)
NRBC # BLD: 0 K/UL (ref 0–0.01)
NRBC BLD-RTO: 0 PER 100 WBC
PHOSPHATE SERPL-MCNC: 3.4 MG/DL (ref 2.6–4.7)
PLATELET # BLD AUTO: 210 K/UL (ref 150–400)
PMV BLD AUTO: 10.1 FL (ref 8.9–12.9)
POTASSIUM SERPL-SCNC: 3.8 MMOL/L (ref 3.5–5.1)
PROT SERPL-MCNC: 6.9 G/DL (ref 6.4–8.2)
RBC # BLD AUTO: 5.55 M/UL (ref 4.1–5.7)
RBC MORPH BLD: ABNORMAL
SODIUM SERPL-SCNC: 140 MMOL/L (ref 136–145)
WBC # BLD AUTO: 11.4 K/UL (ref 4.1–11.1)

## 2022-09-13 PROCEDURE — 80048 BASIC METABOLIC PNL TOTAL CA: CPT

## 2022-09-13 PROCEDURE — 36415 COLL VENOUS BLD VENIPUNCTURE: CPT

## 2022-09-13 PROCEDURE — 93005 ELECTROCARDIOGRAM TRACING: CPT

## 2022-09-13 PROCEDURE — 83735 ASSAY OF MAGNESIUM: CPT

## 2022-09-13 PROCEDURE — 74011250637 HC RX REV CODE- 250/637: Performed by: STUDENT IN AN ORGANIZED HEALTH CARE EDUCATION/TRAINING PROGRAM

## 2022-09-13 PROCEDURE — 65270000046 HC RM TELEMETRY

## 2022-09-13 PROCEDURE — 74011250636 HC RX REV CODE- 250/636: Performed by: STUDENT IN AN ORGANIZED HEALTH CARE EDUCATION/TRAINING PROGRAM

## 2022-09-13 PROCEDURE — 80076 HEPATIC FUNCTION PANEL: CPT

## 2022-09-13 PROCEDURE — 85025 COMPLETE CBC W/AUTO DIFF WBC: CPT

## 2022-09-13 PROCEDURE — 84100 ASSAY OF PHOSPHORUS: CPT

## 2022-09-13 RX ORDER — OLANZAPINE 2.5 MG/1
2.5 TABLET ORAL
Status: CANCELLED | OUTPATIENT
Start: 2022-09-13

## 2022-09-13 RX ORDER — METFORMIN HYDROCHLORIDE 750 MG/1
750 TABLET, EXTENDED RELEASE ORAL 2 TIMES DAILY WITH MEALS
Status: CANCELLED | OUTPATIENT
Start: 2022-09-13

## 2022-09-13 RX ORDER — TEMAZEPAM 15 MG/1
15 CAPSULE ORAL
Status: CANCELLED | OUTPATIENT
Start: 2022-09-13

## 2022-09-13 RX ORDER — OLANZAPINE 2.5 MG/1
2.5 TABLET ORAL
Status: DISCONTINUED | OUTPATIENT
Start: 2022-09-13 | End: 2022-09-15 | Stop reason: HOSPADM

## 2022-09-13 RX ORDER — ADHESIVE BANDAGE
30 BANDAGE TOPICAL DAILY PRN
Status: CANCELLED | OUTPATIENT
Start: 2022-09-13

## 2022-09-13 RX ORDER — INDOMETHACIN 50 MG/1
50 CAPSULE ORAL
Status: CANCELLED | OUTPATIENT
Start: 2022-09-13

## 2022-09-13 RX ORDER — SERTRALINE HYDROCHLORIDE 50 MG/1
100 TABLET, FILM COATED ORAL DAILY
Status: DISCONTINUED | OUTPATIENT
Start: 2022-09-14 | End: 2022-09-15 | Stop reason: HOSPADM

## 2022-09-13 RX ORDER — FLECAINIDE ACETATE 100 MG/1
150 TABLET ORAL EVERY 12 HOURS
Status: DISCONTINUED | OUTPATIENT
Start: 2022-09-14 | End: 2022-09-15 | Stop reason: HOSPADM

## 2022-09-13 RX ORDER — SODIUM CHLORIDE 9 MG/ML
75 INJECTION, SOLUTION INTRAVENOUS CONTINUOUS
Status: CANCELLED | OUTPATIENT
Start: 2022-09-13

## 2022-09-13 RX ORDER — TEMAZEPAM 15 MG/1
15 CAPSULE ORAL
Status: DISCONTINUED | OUTPATIENT
Start: 2022-09-14 | End: 2022-09-15 | Stop reason: HOSPADM

## 2022-09-13 RX ORDER — PANTOPRAZOLE SODIUM 40 MG/1
40 TABLET, DELAYED RELEASE ORAL
Status: CANCELLED | OUTPATIENT
Start: 2022-09-13

## 2022-09-13 RX ORDER — DIPHENHYDRAMINE HYDROCHLORIDE 50 MG/ML
25 INJECTION, SOLUTION INTRAMUSCULAR; INTRAVENOUS
Status: DISCONTINUED | OUTPATIENT
Start: 2022-09-13 | End: 2022-09-15 | Stop reason: HOSPADM

## 2022-09-13 RX ORDER — ATORVASTATIN CALCIUM 40 MG/1
40 TABLET, FILM COATED ORAL
Status: CANCELLED | OUTPATIENT
Start: 2022-09-13

## 2022-09-13 RX ORDER — ALLOPURINOL 100 MG/1
300 TABLET ORAL DAILY
Status: CANCELLED | OUTPATIENT
Start: 2022-09-14

## 2022-09-13 RX ORDER — METOPROLOL TARTRATE 50 MG/1
50 TABLET ORAL 2 TIMES DAILY
Status: DISCONTINUED | OUTPATIENT
Start: 2022-09-14 | End: 2022-09-15 | Stop reason: HOSPADM

## 2022-09-13 RX ORDER — ACETAMINOPHEN 325 MG/1
650 TABLET ORAL
Status: DISCONTINUED | OUTPATIENT
Start: 2022-09-13 | End: 2022-09-15 | Stop reason: HOSPADM

## 2022-09-13 RX ORDER — METOPROLOL TARTRATE 50 MG/1
50 TABLET ORAL 2 TIMES DAILY
Status: CANCELLED | OUTPATIENT
Start: 2022-09-13

## 2022-09-13 RX ORDER — SERTRALINE HYDROCHLORIDE 50 MG/1
100 TABLET, FILM COATED ORAL DAILY
Status: CANCELLED | OUTPATIENT
Start: 2022-09-14

## 2022-09-13 RX ORDER — ALLOPURINOL 300 MG/1
300 TABLET ORAL DAILY
Status: DISCONTINUED | OUTPATIENT
Start: 2022-09-14 | End: 2022-09-15 | Stop reason: HOSPADM

## 2022-09-13 RX ORDER — FLECAINIDE ACETATE 100 MG/1
150 TABLET ORAL EVERY 12 HOURS
Status: CANCELLED | OUTPATIENT
Start: 2022-09-13

## 2022-09-13 RX ORDER — DIPHENHYDRAMINE HYDROCHLORIDE 50 MG/ML
25 INJECTION, SOLUTION INTRAMUSCULAR; INTRAVENOUS
Status: CANCELLED | OUTPATIENT
Start: 2022-09-13

## 2022-09-13 RX ORDER — FLECAINIDE ACETATE 100 MG/1
150 TABLET ORAL EVERY 12 HOURS
Status: DISCONTINUED | OUTPATIENT
Start: 2022-09-13 | End: 2022-09-13 | Stop reason: HOSPADM

## 2022-09-13 RX ORDER — MAGNESIUM SULFATE 1 G/100ML
1 INJECTION INTRAVENOUS ONCE
Status: COMPLETED | OUTPATIENT
Start: 2022-09-13 | End: 2022-09-13

## 2022-09-13 RX ORDER — SUCRALFATE 1 G/1
1 TABLET ORAL
Status: CANCELLED | OUTPATIENT
Start: 2022-09-13

## 2022-09-13 RX ORDER — TRAZODONE HYDROCHLORIDE 50 MG/1
50 TABLET ORAL 2 TIMES DAILY
Status: CANCELLED | OUTPATIENT
Start: 2022-09-13

## 2022-09-13 RX ORDER — ACETAMINOPHEN 325 MG/1
650 TABLET ORAL
Status: CANCELLED | OUTPATIENT
Start: 2022-09-13

## 2022-09-13 RX ORDER — ATORVASTATIN CALCIUM 40 MG/1
40 TABLET, FILM COATED ORAL
Status: DISCONTINUED | OUTPATIENT
Start: 2022-09-14 | End: 2022-09-15 | Stop reason: HOSPADM

## 2022-09-13 RX ORDER — INDOMETHACIN 25 MG/1
50 CAPSULE ORAL
Status: DISCONTINUED | OUTPATIENT
Start: 2022-09-13 | End: 2022-09-15 | Stop reason: HOSPADM

## 2022-09-13 RX ORDER — FLECAINIDE ACETATE 100 MG/1
50 TABLET ORAL ONCE
Status: DISCONTINUED | OUTPATIENT
Start: 2022-09-13 | End: 2022-09-13 | Stop reason: HOSPADM

## 2022-09-13 RX ORDER — SUCRALFATE 1 G/1
1 TABLET ORAL
Status: DISCONTINUED | OUTPATIENT
Start: 2022-09-14 | End: 2022-09-15 | Stop reason: HOSPADM

## 2022-09-13 RX ORDER — PANTOPRAZOLE SODIUM 40 MG/1
40 TABLET, DELAYED RELEASE ORAL
Status: DISCONTINUED | OUTPATIENT
Start: 2022-09-14 | End: 2022-09-15 | Stop reason: HOSPADM

## 2022-09-13 RX ORDER — ADHESIVE BANDAGE
30 BANDAGE TOPICAL DAILY PRN
Status: DISCONTINUED | OUTPATIENT
Start: 2022-09-13 | End: 2022-09-15 | Stop reason: HOSPADM

## 2022-09-13 RX ORDER — TRAZODONE HYDROCHLORIDE 50 MG/1
50 TABLET ORAL 2 TIMES DAILY
Status: DISCONTINUED | OUTPATIENT
Start: 2022-09-14 | End: 2022-09-15 | Stop reason: HOSPADM

## 2022-09-13 RX ADMIN — METFORMIN HYDROCHLORIDE 750 MG: 750 TABLET, EXTENDED RELEASE ORAL at 09:40

## 2022-09-13 RX ADMIN — FLECAINIDE ACETATE 100 MG: 100 TABLET ORAL at 08:51

## 2022-09-13 RX ADMIN — METOPROLOL TARTRATE 50 MG: 50 TABLET, FILM COATED ORAL at 17:12

## 2022-09-13 RX ADMIN — TRAZODONE HYDROCHLORIDE 50 MG: 50 TABLET ORAL at 17:12

## 2022-09-13 RX ADMIN — SODIUM CHLORIDE 75 ML/HR: 9 INJECTION, SOLUTION INTRAVENOUS at 03:56

## 2022-09-13 RX ADMIN — POTASSIUM BICARBONATE 20 MEQ: 782 TABLET, EFFERVESCENT ORAL at 12:45

## 2022-09-13 RX ADMIN — METOPROLOL TARTRATE 50 MG: 50 TABLET, FILM COATED ORAL at 08:51

## 2022-09-13 RX ADMIN — SUCRALFATE 1 G: 1 TABLET ORAL at 11:46

## 2022-09-13 RX ADMIN — SERTRALINE HYDROCHLORIDE 100 MG: 50 TABLET ORAL at 08:50

## 2022-09-13 RX ADMIN — PANTOPRAZOLE SODIUM 40 MG: 40 TABLET, DELAYED RELEASE ORAL at 17:12

## 2022-09-13 RX ADMIN — PANTOPRAZOLE SODIUM 40 MG: 40 TABLET, DELAYED RELEASE ORAL at 06:28

## 2022-09-13 RX ADMIN — MAGNESIUM SULFATE 1 G: 1 INJECTION INTRAVENOUS at 11:46

## 2022-09-13 RX ADMIN — SUCRALFATE 1 G: 1 TABLET ORAL at 17:12

## 2022-09-13 RX ADMIN — APIXABAN 5 MG: 5 TABLET, FILM COATED ORAL at 08:50

## 2022-09-13 RX ADMIN — ALLOPURINOL 300 MG: 100 TABLET ORAL at 08:51

## 2022-09-13 RX ADMIN — ACETAMINOPHEN 650 MG: 325 TABLET, FILM COATED ORAL at 11:58

## 2022-09-13 RX ADMIN — METFORMIN HYDROCHLORIDE 750 MG: 750 TABLET, EXTENDED RELEASE ORAL at 18:47

## 2022-09-13 NOTE — PROGRESS NOTES
Hospitalist Progress Note    NAME: Tiago Hurst   :  1955   MRN:  [id number]   Room Number:  QTM7/61  @ [facility]     Please note that this dictation was completed with Alban Schafer, the computer voice recognition software. Quite often unanticipated grammatical, syntax, homophones, and other interpretive errors are inadvertently transcribed by the computer software. Please disregard these errors. Please excuse any errors that have escaped final proofreading. Interim Hospital Summary: 77 y.o. male whom presented on 2022 with      Assessment / Plan:  Anticipated discharge date : TBD   Anticipated disposition : TBD   Barriers to discharge : medical stability     Atrial fibrillation with rapid ventricular response POA   Lightheadedness POA   Hyperlipidemia POA  Hypertension POA  EKG interpreted independently - Afib with RVR, discussed with Dr Laurance Hatchet and Dr Shruthi Barjaas, electrophysiologist.   - Flecainide increased to 150 mg BID  - Metoprolol increased to 50 mg BID  - Continue PRN metoprolol  - IV hydration   - will discuss with cardiology regarding plan of care        Mood disorder POA   Suicidal ideation POA  - no current SI/HI. - Psychiatry consult, does not need inpatient psych  - continue sertraline, trazodone. Elevated creatinine POA  Baseline 0.9-1.1. suspect due to rapid afib and decreased perfusion. Leukocytosis POA  Suspect stress response. Eosinophils 20%. Will  monitor        GERD POA  - continue PPI       Code Status: Full   Surrogate Decision Maker:  Wife      DVT Prophylaxis:  Apixaban  GI Prophylaxis: not indicated  Baseline: ambulatory independently        Subjective:     Chief Complaint / Reason for Physician Visit  \"Feeling a lot better\". Discussed with RN events overnight.      Review of Systems:  No fevers, chills, appetite change, cough, sputum production, shortness of breath, dyspnea on exertion, nausea, vomitting, diarrhea, constipation, chest pain, leg edema, abdominal pain, joint pain, rash, itching. Tolerating PT/OT. Tolerating diet. Objective:     VITALS:   Last 24hrs VS reviewed since prior progress note. Most recent are:  Patient Vitals for the past 24 hrs:   Temp Pulse Resp BP SpO2   09/13/22 0851 -- (!) 126 -- 103/71 --   09/13/22 0800 -- (!) 125 -- -- --   09/13/22 0355 97.8 °F (36.6 °C) (!) 118 18 97/72 93 %   09/13/22 0259 -- (!) 118 -- -- --   09/12/22 2342 98.7 °F (37.1 °C) (!) 120 19 92/75 92 %   09/12/22 2259 -- (!) 121 -- -- --   09/12/22 1938 98.2 °F (36.8 °C) -- 21 96/70 91 %   09/12/22 1859 -- (!) 121 -- -- --   09/12/22 1830 -- (!) 121 18 103/77 93 %   09/12/22 1707 -- (!) 119 -- 91/67 --   09/12/22 1610 98.2 °F (36.8 °C) (!) 126 16 102/69 95 %   09/12/22 1547 -- (!) 129 -- -- --   09/12/22 1232 -- -- -- -- 96 %   09/12/22 1200 -- (!) 129 -- -- --   09/12/22 1138 97.9 °F (36.6 °C) (!) 129 18 (!) 150/104 96 %       Intake/Output Summary (Last 24 hours) at 9/13/2022 1044  Last data filed at 9/13/2022 0630  Gross per 24 hour   Intake 1981.25 ml   Output 975 ml   Net 1006.25 ml        PHYSICAL EXAM:  General: Alert, cooperative, no acute distress    EENT:  EOMI. Anicteric sclerae. MMM  Resp:  CTA bilaterally, no wheezing or rales. No accessory muscle use  CV:  Irregular  rhythm,  normal S1/S2, no murmurs rubs gallops, No edema  GI:  Soft, Non distended, Non tender. +Bowel sounds  Neurologic:  Alert and oriented X 3, normal speech,   Psych:   Good insight. Not anxious nor agitated  Skin:  No rashes.   No jaundice    Reviewed most current lab test results and cultures  YES  Reviewed most current radiology test results   YES  Review and summation of old records today    NO  Reviewed patient's current orders and MAR    YES  PMH/SH reviewed - no change compared to H&P  ________________________________________________________________________  Care Plan discussed with:    Comments   Patient x    Family  x Wife at bedside   RN x    Care Manager x    Consultant  x Dr Erendira christine Multidiciplinary team rounds were held today with , nursing, pharmacist and clinical coordinator. Patient's plan of care was discussed; medications were reviewed and discharge planning was addressed. ________________________________________________________________________  Total NON critical care TIME:  45   Minutes  CRITICAL CARE WAS PERFORMED FOR THIS ENCOUNTER: YES. I had a face to face encounter with the patient, reviewed and interpreted patient data including clinical events, labs, images, vital signs, I/O's, and examined patient. I have discussed the case and the plan and management of the patient's care with the consulting services, the bedside nurses and necessary ancillary providers. This patient has a high probability of imminent, clinically significant deterioration, which requires the highest level of preparedness to intervene urgently. I participated in the decision-making and personally managed or directed the management of the following life and organ supporting interventions that required my frequent assessment to treat or prevent imminent deterioration. I personally spent 25  minutes of critical care time. This is time spent at this critically ill patient's bedside actively involved in patient care as well as the coordination of care and discussions with the patient's family. This does not include any procedural time which has been billed separately. Total CRITICAL CARE TIME Spent:   Minutes non procedure based      Comments   >50% of visit spent in counseling and coordination of care x    ________________________________________________________________________  Sheree Aschoff, MD     Procedures: see electronic medical records for all procedures/Xrays and details which were not copied into this note but were reviewed prior to creation of Plan.       LABS:  I reviewed today's most current labs and imaging studies.   Pertinent labs include:  Recent Labs     09/13/22  0354 09/12/22 2018   WBC 11.4* 10.6   HGB 15.7 15.5   HCT 48.1 47.1    225     Recent Labs     09/13/22  0354 09/12/22  1050    140   K 3.8 4.1    104   CO2 26 31   * 107*   BUN 18 22*   CREA 1.13 1.34*   CA 8.6 9.5   MG 1.9 1.7   PHOS 3.4 3.6   ALB 3.4* 3.9   TBILI 0.5 0.6   ALT 18 26       Signed: Dickson Shaffer MD

## 2022-09-13 NOTE — BSMART NOTE
BSMART Liaison Team Note     LOS:  1     Patient goal(s) for today: take medications as prescribed, make needs known in an appropriate manner, utilize coping skills  BSMART Liaison team focus/goals: assess needs, provide support and education, work on discharge planning with care management team    Progress note: Patient assessed face to face. He was alert and oriented. He was pleasant and cooperative with visit. His wife was at bedside and he asked for her to remain. He denies SI, HI, and hallucinations. He reports some anxiety/stress relating to his medication concerns but overall reports doing well despite the medical. He has insight into his situation and judgement is good. He is open to therapy and follow up for his mental health. Patient reports he is doing well and has talked with his wife about a lot. He reports that he has a good relationship with his wife but that he understands that he needs to talk to someone other than her about things. Wife reports she feels like she has done all that she can and he needs to talk to someone else for more insight into his behavior and trauma. Patient reports that he has done therapy before but that the therapist was a male and was past [employer] and was not a good fit for him. After discussion of therapy and preferences, patient agreeable to a female therapist though if this was not an option he would be okay with a male. He states after speaking to Dr Alonzo Kelley he thinks that a female would be good because she could provide a different (\"female\") perspective on situations. He reports that he feels talking with Dr Prachi Adkins has been helpful today because he has some more insight into his situation and his behaviors. He reports that he does not feel he and his wife need marriage counseling but that he would like to bring her to his sessions.  Explained that typically therapy is done with just the patient but that a therapist will invite a family member to come at times if it is deemed to be helpful. Explained that this is up to the therapist but could be discussed when he has his first appointment. Wife asked if therapy appointment could be made with a place called [facility] because someone recommended it to her. This writer will reach out to Dimitri Antonio (R) [facility]  - [facility] location) to see if they are accepting new patients. Discussed possibility of a psychiatrist but the patient and his wife feel the PCP is managing his medications well. Patient discussed his issue with sleep. He reports that he takes 2 medications for sleep and Dr Erick Melgoza mentioned prazosin. Patient reports he is unsure if this is in addition to his current medications or in place of a medication. He reports discussing possibility of a future temazepam taper as well. Discussed that sometimes medications need adjustments due to tolerance to doses or not working as medications once did. Patient reports that he has been taking the medications with some benefit but that they do not always work and he still struggles with insomnia. 11:30pm: Called and left a message for a new client appointment with Dimitri Antonio (R). Barriers to Discharge: Medical treatment  Guns in the home: yes     Outpatient provider(s):  None, to be linked  Insurance info/prescription coverage:  [facility] [address]    Diagnosis: Bereavement,  Depression and anxiety and Adjustment Disorder with Mixed Emotional Features, sleep deprivation    Plan:  Currently admitted for ongoing medical treatment. Per Dr Erick Melgoza, patient does not meet inpatient criteria. Patient needs follow up appointments for therapy and a safety plan before can be discharged medically once stable. This writer to work on therapy appointment and coordinate with care management team.  Follow up Psych Consult placed? no and Dr Erick Melgoza already met with patient. Psychiatrist updated?  yes       Participating treatment team members: [address] Srinivasa Johnson, wife- Marlo Bernard

## 2022-09-13 NOTE — PROGRESS NOTES
1915: Bedside and Verbal shift change report given to Elizabeth Chen RN (oncoming nurse) by [facility], RN (offgoing nurse). Report included the following information SBAR, Kardex, MAR, and Recent Results. 0720: Bedside and Verbal shift change report given to Jonathan Edwards RN (oncoming nurse) by Elizabeth Chen RN (offgoing nurse). Report included the following information SBAR, Kardex, MAR, and Recent Results.

## 2022-09-13 NOTE — DISCHARGE SUMMARY
Hospitalist Discharge Summary     Patient ID:  Jaycob Robledo  [id number]  41 y.o.  1955    PCP on record: Kirill Austin MD    Admit date: 9/12/2022  Discharge date and time: 9/13/2022    Please note that this dictation was completed with [employer], the [employer] voice recognition software. Quite often unanticipated grammatical, syntax, homophones, and other interpretive errors are inadvertently transcribed by the computer software. Please disregard these errors. Please excuse any errors that have escaped final proofreading. Admission Diagnoses: Supraventricular tachycardia ([address] [I47.1]    Discharge Diagnoses: Active Problems:    Supraventricular tachycardia ([address] (9/12/2022)           Hospital Course:     Atrial fibrillation with rapid ventricular response POA   Lightheadedness POA   Hyperlipidemia POA  Hypertension POA  EKG interpreted independently - Afib with RVR. Flecainide increased to 150 mg BID, Metoprolol increased to 50 mg BID  Continued to have elevated heart rates, discussed with Dr Sasha Hansen Electrophysiologist and Dr Shanda Munson, cardiologist who have recommended transfer to [facility]. Dr Klaus Dhillon is the accepting hospitalist.   [address] THIS ENCOUNTER: YES. I had a face to face encounter with the patient, reviewed and interpreted patient data including clinical events, labs, images, vital signs, I/O's, and examined patient. I have discussed the case and the plan and management of the patient's care with the consulting services, the bedside nurses and necessary ancillary providers. This patient has a high probability of imminent, clinically significant deterioration, which requires the highest level of preparedness to intervene urgently.  I participated in the decision-making and personally managed or directed the management of the following life and organ supporting interventions that required my frequent assessment to treat or prevent imminent deterioration. I personally spent 35  minutes of critical care time. This is time spent at this critically ill patient's bedside actively involved in patient care as well as the coordination of care and discussions with the patient's family. This does not include any procedural time which has been billed separately. Mood disorder POA   Suicidal ideation POA  no current SI/HI. Psychiatry consult, does not need inpatient psych. continue sertraline, trazodone. Elevated creatinine POA  Baseline 0.9-1.1. suspect due to rapid afib and decreased perfusion. Leukocytosis POA  Suspect stress response. Eosinophils 20%. GERD POA  continue PPI    CONSULTATIONS:  IP CONSULT TO CARDIOLOGY  IP CONSULT TO PSYCHIATRY  IP CONSULT TO CARDIOLOGY    Excerpted HPI from H&P of Renetta Cespedes MD:     77 y.o.  male with PMH of Atrial fibrillation, depression, diabetes, gout, HTN, Insomnia, Sleep apnea who presents as a transfer from [facility] due to lightheadedness. Patient was admitted to [facility] on 9/8 due to suicidal ideation and anger outburst.  RRT called at 10:35 AM for lightheadedness, collapse. Noted patient to be flushed, diaphoretic, sitting on the ground. Reporting feeling that his head feels funny. Per RN Supervisor and [facility] Staff, patient reportedly used the bathroom, came out and 'didn't look' good and collapsed. Patient states he was told he was not feeling well this morning, was told his HR was high, he strained to have a bowel movement, felt a rush to his head and felt like he would pass out so he walked to the hallway and collapsed. Patient reports mild chest tightness but does not have chest pain shortness of breath or palpitations. He has history of Atrial fibrillation has apparently had 7 Cardioversions and 3 Ablation, most recently 1 month ago with Dr Leatha Epley at [facility]. He  is worried about having missed a dose of Apixaban when he was in the ER.       We were asked to admit for work up and evaluation of the above problems. ______________________________________________________________________  DISCHARGE SUMMARY/HOSPITAL COURSE:  for full details see H&P, daily progress notes, labs, consult notes. Visit Vitals  /73 (BP 1 Location: Right upper arm, BP Patient Position: Semi fowlers)   Pulse (!) 126   Temp 98.3 °F (36.8 °C)   Resp 18   SpO2 96%     _______________________________________________________________________  Patient seen and examined by me on discharge day. Pertinent Findings:  Gen:    Not in distress  Chest: Clear lungs  CVS:   Regular rhythm. No edema  Abd:  Soft, not distended, not tender  Neuro:  Alert with good insight. Oriented to person, place, and time   _______________________________________________________________________  DISCHARGE MEDICATIONS:   Current Discharge Medication List          My Recommended Diet, Activity, Wound Care, and follow-up labs are listed in the patient's Discharge Insturctions which I have personally completed and reviewed.     _______________________________________________________________________  DISPOSITION:     Home with Family:    Home with HH/PT/OT/RN:    SNF/LTC:    ALICE:    OTHER: Acute facility       Condition at Discharge:  Stable but critical   _______________________________________________________________________  Follow up with:   PCP : Barry Valenzuela MD  Follow-up Information       Follow up With Specialties Details Why Spenser Urbina MD [address] [address] [phone]                  Total time in minutes spent coordinating this discharge (includes going over instructions, follow-up, prescriptions, and preparing report for sign off to her PCP) :  45 minutes    Signed:  Arnold Espinal MD

## 2022-09-13 NOTE — PROGRESS NOTES
0700[facility]     0800: Vitals WNL. Pt up ad levi to bathroom, one bowel movement, denied dizziness on ambulation. BP stable after ambulating. 1115: EKG complete. Asymptomatic to rapid HR at this time. 1144: MEWS 4. Dr Genevieve Ayala aware, pt transferring out. Pt complaining of a HA. Will medicate. 1500: Attempted to call report x1.     1600: Attempted to call report x2. Per accepting nurse, [address], wants a call  to transport time. 1700: IVF d/c.

## 2022-09-13 NOTE — BSMART NOTE
Updated patient and wife that this [facility] is waiting on a return call for a therapy appointment. They report patient is being transferred to [facility] for continued medical care. They asked how they will find out about the appointment. Explained that this writer and the liaison team also work at that facility and will follow up with the patient once the appointment is scheduled. Wife and patient asked about patient's belongings on the [facility]. They report a devotional book and clothing are still upstairs and wife would like to take them home. This writer spoke with Rukhsana NAVARRO on [facility]. She will gather patient's belongings. This writer retrieved belongings from [facility]. Primary nurse informed belongings are here and is okay with belongings being given to patient. Belongings given to wife and patient.     Yousif Jasmine LPC [facility]
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
## 2022-09-14 VITALS
TEMPERATURE: 98.1 F | DIASTOLIC BLOOD PRESSURE: 71 MMHG | OXYGEN SATURATION: 100 % | SYSTOLIC BLOOD PRESSURE: 110 MMHG | HEART RATE: 54 BPM | RESPIRATION RATE: 20 BRPM

## 2022-09-14 PROBLEM — I48.91 ATRIAL FIBRILLATION WITH RVR (HCC): Status: ACTIVE | Noted: 2022-09-14

## 2022-09-14 LAB
COMMENT, HOLDF: NORMAL
GLUCOSE BLD STRIP.AUTO-MCNC: 108 MG/DL (ref 65–117)
GLUCOSE BLD STRIP.AUTO-MCNC: 88 MG/DL (ref 65–117)
GLUCOSE BLD STRIP.AUTO-MCNC: 90 MG/DL (ref 65–117)
SAMPLES BEING HELD,HOLD: NORMAL
SERVICE CMNT-IMP: NORMAL
TSH SERPL DL<=0.05 MIU/L-ACNC: 4.91 UIU/ML (ref 0.36–3.74)

## 2022-09-14 PROCEDURE — G0378 HOSPITAL OBSERVATION PER HR: HCPCS

## 2022-09-14 PROCEDURE — 65660000001 HC RM ICU INTERMED STEPDOWN

## 2022-09-14 PROCEDURE — 93005 ELECTROCARDIOGRAM TRACING: CPT

## 2022-09-14 PROCEDURE — 74011250637 HC RX REV CODE- 250/637: Performed by: FAMILY MEDICINE

## 2022-09-14 PROCEDURE — 36415 COLL VENOUS BLD VENIPUNCTURE: CPT

## 2022-09-14 PROCEDURE — 84443 ASSAY THYROID STIM HORMONE: CPT

## 2022-09-14 PROCEDURE — 82962 GLUCOSE BLOOD TEST: CPT

## 2022-09-14 RX ORDER — INSULIN LISPRO 100 [IU]/ML
INJECTION, SOLUTION INTRAVENOUS; SUBCUTANEOUS
Status: DISCONTINUED | OUTPATIENT
Start: 2022-09-14 | End: 2022-09-15 | Stop reason: HOSPADM

## 2022-09-14 RX ORDER — METFORMIN HYDROCHLORIDE 750 MG/1
750 TABLET, EXTENDED RELEASE ORAL 2 TIMES DAILY WITH MEALS
Status: DISCONTINUED | OUTPATIENT
Start: 2022-09-14 | End: 2022-09-15 | Stop reason: HOSPADM

## 2022-09-14 RX ORDER — MAGNESIUM SULFATE 100 %
4 CRYSTALS MISCELLANEOUS AS NEEDED
Status: DISCONTINUED | OUTPATIENT
Start: 2022-09-14 | End: 2022-09-15 | Stop reason: HOSPADM

## 2022-09-14 RX ADMIN — TRAZODONE HYDROCHLORIDE 50 MG: 50 TABLET ORAL at 02:56

## 2022-09-14 RX ADMIN — ALLOPURINOL 300 MG: 300 TABLET ORAL at 09:00

## 2022-09-14 RX ADMIN — TEMAZEPAM 15 MG: 15 CAPSULE ORAL at 02:56

## 2022-09-14 RX ADMIN — FLECAINIDE ACETATE 150 MG: 100 TABLET ORAL at 20:55

## 2022-09-14 RX ADMIN — SUCRALFATE 1 G: 1 TABLET ORAL at 12:16

## 2022-09-14 RX ADMIN — PANTOPRAZOLE SODIUM 40 MG: 40 TABLET, DELAYED RELEASE ORAL at 17:49

## 2022-09-14 RX ADMIN — APIXABAN 5 MG: 5 TABLET, FILM COATED ORAL at 09:07

## 2022-09-14 RX ADMIN — PANTOPRAZOLE SODIUM 40 MG: 40 TABLET, DELAYED RELEASE ORAL at 06:43

## 2022-09-14 RX ADMIN — SERTRALINE 100 MG: 50 TABLET, FILM COATED ORAL at 09:06

## 2022-09-14 RX ADMIN — SUCRALFATE 1 G: 1 TABLET ORAL at 09:06

## 2022-09-14 RX ADMIN — APIXABAN 5 MG: 5 TABLET, FILM COATED ORAL at 02:56

## 2022-09-14 RX ADMIN — FLECAINIDE ACETATE 150 MG: 100 TABLET ORAL at 02:56

## 2022-09-14 RX ADMIN — ATORVASTATIN CALCIUM 40 MG: 40 TABLET, FILM COATED ORAL at 20:56

## 2022-09-14 RX ADMIN — APIXABAN 5 MG: 5 TABLET, FILM COATED ORAL at 20:56

## 2022-09-14 RX ADMIN — ATORVASTATIN CALCIUM 40 MG: 40 TABLET, FILM COATED ORAL at 02:56

## 2022-09-14 RX ADMIN — SUCRALFATE 1 G: 1 TABLET ORAL at 17:49

## 2022-09-14 RX ADMIN — TEMAZEPAM 15 MG: 15 CAPSULE ORAL at 21:00

## 2022-09-14 RX ADMIN — SUCRALFATE 1 G: 1 TABLET ORAL at 20:56

## 2022-09-14 RX ADMIN — TRAZODONE HYDROCHLORIDE 50 MG: 50 TABLET ORAL at 17:49

## 2022-09-14 RX ADMIN — FLECAINIDE ACETATE 150 MG: 100 TABLET ORAL at 09:05

## 2022-09-14 RX ADMIN — TRAZODONE HYDROCHLORIDE 50 MG: 50 TABLET ORAL at 20:55
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
## 2022-09-14 NOTE — PROGRESS NOTES
1820 Pt anxious to leave as AMA, he and his wife said that he has PCP appointment @11:30 am, his wife works until 12 pm tomorrow. MD notified. Dr. Fernando Regalado said that pt can't be d/adri until he sees Dr. Marco Andres. While this RN told pt about it, Dr. Marcia Schulz came and explained that pt needs to see Dr. Marco Andres this evening, if he is okay for pt to d/c, let Dr. Marcia Schulz know @[phone], and pt can go home tonight.

## 2022-09-14 NOTE — PROGRESS NOTES
1820 Pt anxious to leave as AMA, he and his wife said that he has PCP appointment @11:30 am, his wife works until 12 pm tomorrow. MD notified. Dr. Janeth Breen said that pt can't be d/adri until he sees Dr. Ratna Craig. While this RN told pt about it, Dr. Jane Rosales came and explained that pt needs to see Dr. Ratna Craig this evening, if he is okay for pt to d/c, let Dr. Jane Rosales know @[phone], and pt can go home tonight.

## 2022-09-14 NOTE — PROGRESS NOTES
Problem: General Medical Care Plan  Goal: *Vital signs within specified parameters  Outcome: Progressing Towards Goal

## 2022-09-14 NOTE — BSMART NOTE
BSMART Liaison Team Note     LOS:  1     Patient goal(s) for today: take medications as prescribed, make needs known in an appropriate manner, utilize coping skills  BSMART Liaison team focus/goals: assess needs, provide support and education, work on discharge planning with care management team    Progress note: MSW provided Pt with printed bios for [employer], [address], [facility] and  [facility], LPC, CSOT[facility] to review and encouraged Pt to call to set up his first appointment, per practice policy. Pt verbalized understanding and agreement. He is alert, oriented and calm. He denies SI, HI and AVH. He is future focuse and describes feeling hopeful about the future and his treatment. Barriers to Discharge: Medical treatment  Guns in the home: yes      Outpatient provider(s):  None, to be linked  Insurance info/prescription coverage:  [facility] [address]     Diagnosis: Bereavement,  Depression and anxiety and Adjustment Disorder with Mixed Emotional Features, sleep deprivation     Plan:  Psychiatrically stable for discharge. Defer to medical team when stable for discharge.    Follow up Psych Consult placed? no   Psychiatrist updated? no      Participating treatment team members: Aram Farias, JOSIAH Roberts

## 2022-09-14 NOTE — PROGRESS NOTES
Patient was admitted early this morning by my colleague Dr. Clementina Cruz. I have reviewed the chart for notes, lab and imaging results. Interviewed and examined patient. Patient was initially admitted to [facility] for atrial fibrillation with RVR, lightheadedness. Patient was transferred to [facility] when patient had near syncope due to A. fib with RVR. He was followed by EP and general cardiology. Patient converted to sinus after he arrived here. He is currently in sinus. He denies any complaints. He is eager to be discharged. I explained to him due to recurrence of the paroxysmal A. fib from which she was symptomatic including presyncope he would like to be evaluated by EP. He told me Dr. Dakota Macedo has done cardioversion and ablation on him. I have let Dr. Sales Gathers via [facility]. See admission H&P for other details.
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
## 2022-09-14 NOTE — PROGRESS NOTES
1910: Bedside and Verbal shift change report given to Mary Mcdonald RN (oncoming nurse) by Doc Montemayor RN (offgoing nurse). Report included the following information SBAR, Kardex, MAR, and Recent Results. 2043: [facility] at bedside to load patient for transport to [facility]. 2045: Patient departed unit with [facility].

## 2022-09-14 NOTE — PROGRESS NOTES
Bedside and Verbal shift change report given to Chiquita Garcia and Juanita Chavez RN (oncoming nurse) by Tony Cristina RN (offgoing nurse). Report included the following information SBAR and Kardex.

## 2022-09-14 NOTE — PROGRESS NOTES
Patient converted to sinus oneyda at 0103. EKG obtained to confirm. Dr. Clau Garcia at bedside made aware.
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
## 2022-09-15 LAB
ATRIAL RATE: 64 BPM
CALCULATED P AXIS, ECG09: 75 DEGREES
CALCULATED R AXIS, ECG10: 68 DEGREES
CALCULATED T AXIS, ECG11: 25 DEGREES
DIAGNOSIS, 93000: NORMAL
P-R INTERVAL, ECG05: 178 MS
Q-T INTERVAL, ECG07: 442 MS
QRS DURATION, ECG06: 98 MS
QTC CALCULATION (BEZET), ECG08: 455 MS
VENTRICULAR RATE, ECG03: 64 BPM

## 2022-09-15 NOTE — PROGRESS NOTES
Discharge instructions given to pt as ordered by MD, opportunity given to ask and answer questions by pt and spouse.   Transported off unit via wheelchair, spouse drove pt home via their private automobile

## 2022-09-15 NOTE — CONSULTS
Carlos Eduardo Hsu M.D. and Mai Song M.D.  [address] [facility], [address] [place] [address], [address] [place] [address]  [phone]   [email or web address]      Date of  Admission: 9/13/2022 10:14 PM     Assessment and Plan:     Jacquie Estrada is admitted with afib with rvr  # AF  # HTN  # DM    REcurrent afib wtth rvr now [place] in [place]. [id number] Karen Arriaga for discharge. - cont home meds including anticoag and flecainide.  - will follw up in office. REASON FOR CONSULT: afib  Primary Cardiologist:DESIREE Hsu    HPI: 77 yom with history of af prior ablations, most recent 8/2022 who was admitte [facility] with anxiety and anger outburst. 2 Days later was noted to have afib. Felt palpitations. Had a BM and was straining felt lightehaded after. Denies any cp, sob  he was in afib with rvr for 2 days and transferred to [facility]. He converted this am.    Under a lot of stress, brother passed way in April. He had misse done day of meds. Patient Active Problem List    Diagnosis Date Noted    Atrial fibrillation with RVR ([facility] [address] 09/14/2022    Supraventricular tachycardia ([facility] [address] 09/12/2022      Jeffrey Matthew MD  Past Medical History:   Diagnosis Date    Arrhythmia     A- FLUTTER  A-FIB CARDIOVERSION 8/2021, CARDIAC ABLATION 2018, 2020    COVID-19 vaccine series completed     Depression     Diabetes ([facility] [address]     Gout     Hypertension     Insomnia     Sleep apnea     CPAP      Past Surgical History:   Procedure Laterality Date    HX COLONOSCOPY      HX KNEE REPLACEMENT Right 2007    NEUROLOGICAL PROCEDURE UNLISTED  1997    LUMBAR 4-5 DISCECTOMY    ME CARDIAC SURG PROCEDURE UNLIST  2018, 2020    CARDIAC ABLATION    ME CARDIAC SURG PROCEDURE UNLIST  08/2021    CARDIOVERSION X 4 OR 5     Allergies   Allergen Reactions    Azithromycin Other (comments)     Patient has a.fib and it \"threw off\" his heart rhythm. Erythromycin Rash    Pcn [Penicillins] Rash     AS CHILD.   PT DOES NOT KNOW SEVERITY        Family History   Problem Relation Age of Onset    Diabetes Mother     Dementia Mother     Hypertension Mother     Diabetes Father     Hypertension Father     Diabetes Sister     Diabetes Brother     Kidney Disease Brother     Heart Disease Maternal Grandfather     Diabetes Sister     Diabetes Sister     Anesth Problems Neg Hx       Social History     Socioeconomic History    Marital status:      Spouse name: Not on file    Number of children: Not on file    Years of education: Not on file    Highest education level: Not on file   Occupational History    Not on file   Tobacco Use    Smoking status: Never    Smokeless tobacco: Never   Vaping Use    Vaping Use: Never used   Substance and Sexual Activity    Alcohol use: Not Currently    Drug use: Never    Sexual activity: Not on file   Other Topics Concern    Not on file   Social History Narrative    Not on file     Social Determinants of Health     Financial Resource Strain: Not on file   Food Insecurity: Not on file   Transportation Needs: Not on file   Physical Activity: Not on file   Stress: Not on file   Social Connections: Not on file   Intimate Partner Violence: Not on file   Housing Stability: Not on file     Current Facility-Administered Medications   Medication Dose Route Frequency    [Held by provider] metFORMIN ER (GLUCOPHAGE XR) tablet 750 mg  750 mg Oral BID WITH MEALS    glucose chewable tablet 16 g  4 Tablet Oral PRN    glucagon (GLUCAGEN) injection 1 mg  1 mg IntraMUSCular PRN    dextrose 10 % infusion 0-250 mL  0-250 mL IntraVENous PRN    insulin lispro (HUMALOG) injection   SubCUTAneous AC&HS    OLANZapine (ZyPREXA) tablet 2.5 mg  2.5 mg Oral Q6H PRN    diphenhydrAMINE (BENADRYL) injection 25 mg  25 mg IntraMUSCular BID PRN    magnesium hydroxide (MILK OF MAGNESIA) 400 mg/5 mL oral suspension 30 mL  30 mL Oral DAILY PRN    acetaminophen (TYLENOL) tablet 650 mg  650 mg Oral Q4H PRN    sucralfate (CARAFATE) tablet 1 g  1 g Oral AC&HS    pantoprazole (PROTONIX) tablet 40 mg  40 mg Oral ACB&D    allopurinoL (ZYLOPRIM) tablet 300 mg  300 mg Oral DAILY    sertraline (ZOLOFT) tablet 100 mg  100 mg Oral DAILY    atorvastatin (LIPITOR) tablet 40 mg  40 mg Oral QHS    apixaban (ELIQUIS) tablet 5 mg  5 mg Oral Q12H    traZODone (DESYREL) tablet 50 mg  50 mg Oral BID    temazepam (RESTORIL) capsule 15 mg  15 mg Oral QHS    indomethacin (INDOCIN) capsule 50 mg  50 mg Oral TID PRN    metoprolol tartrate (LOPRESSOR) tablet 50 mg  50 mg Oral BID    flecainide (TAMBOCOR) tablet 150 mg  150 mg Oral Q12H           Review of Symptoms:  A comprehensive review of systems was negative in 11 points other than stated above in HPI. Physical Exam    Visit Vitals  /64 (BP 1 Location: Right upper arm, BP Patient Position: Sitting)   Pulse (!) 54   Temp 98 °F (36.7 °C)   Resp (P) 20   SpO2 (P) 100%     Skin warm and dry  HEENT: WNL  Oropharynx without exudate. Neck supple  Lungs clear  Heart - RRR, Normal S1/ S2   No Mummurs, click or Rubs  No S3 or S4  Abdomen soft and non tender,   Pulses 2+ throughout   Neuro:  Normal facial grimace,  Moves all extremities. AAAO   Psych: unanxious    Labs:   Recent Results (from the past 24 hour(s))   SAMPLES BEING HELD    Collection Time: 09/14/22  1:38 AM   Result Value Ref Range    SAMPLES BEING HELD 1LAV,1GOLD     COMMENT        Add-on orders for these samples will be processed based on acceptable specimen integrity and analyte stability, which may vary by analyte.    TSH 3RD GENERATION    Collection Time: 09/14/22  1:38 AM   Result Value Ref Range    TSH 4.91 (H) 0.36 - 3.74 uIU/mL   GLUCOSE, POC    Collection Time: 09/14/22  9:15 AM   Result Value Ref Range    Glucose (POC) 90 65 - 117 mg/dL    Performed by [employer], POC    Collection Time: 09/14/22 11:42 AM   Result Value Ref Range    Glucose (POC) 108 65 - 117 mg/dL    Performed by [employer], POC    Collection Time: 09/14/22  4:34 PM   Result Value Ref Range    Glucose (POC) 88 65 - 117 mg/dL    Performed by John Betsy        Cardiographics    Telemetry: sinus oneyda  ECG: SR  Echocardiogram: n/a

## 2022-09-15 NOTE — DISCHARGE INSTRUCTIONS
Discharge Instructions       PATIENT ID: Anitha Lyons  MRN: [id number]   YOB: 1955    DATE OF ADMISSION: [unfilled]    DATE OF DISCHARGE: 9/14/2022    PRIMARY CARE PROVIDER: @PCP@     ATTENDING PHYSICIAN: [unfilled]  DISCHARGING PROVIDER: Osman Michael MD    To contact this individual call [phone] and ask the  to page. If unavailable ask to be transferred the Adult Hospitalist Department. DISCHARGE DIAGNOSES A fib with RVR    CONSULTATIONS: Cardiologist    PROCEDURES/SURGERIES: * No surgery found *    PENDING TEST RESULTS:   At the time of discharge the following test results are still pending: none    FOLLOW UP APPOINTMENTS:   PCP  Cardiologist    ADDITIONAL CARE RECOMMENDATIONS: as above    DIET: Cardiac Diet    ACTIVITY: Activity as tolerated    DISCHARGE MEDICATIONS:   See Medication Reconciliation Form    It is important that you take the medication exactly as they are prescribed. Keep your medication in the bottles provided by the pharmacist and keep a list of the medication names, dosages, and times to be taken in your wallet. Do not take other medications without consulting your doctor. NOTIFY YOUR PHYSICIAN FOR ANY OF THE FOLLOWING:   Fever over 101 degrees for 24 hours. Chest pain, shortness of breath, fever, chills, nausea, vomiting, diarrhea, change in mentation, falling, weakness, bleeding. Severe pain or pain not relieved by medications. Or, any other signs or symptoms that you may have questions about.       DISPOSITION:   xx Home With:   OT  PT  HH  RN       SNF/Inpatient Rehab/LTAC    Independent/assisted living    Hospice    Other:     CDMP Checked:   Yes x     PROBLEM LIST Updated:  Yes x       Signed:   Osman Michael MD  9/14/2022  9:03 PM

## 2022-10-05 LAB
ATRIAL RATE: 159 BPM
CALCULATED R AXIS, ECG10: 91 DEGREES
CALCULATED T AXIS, ECG11: 20 DEGREES
DIAGNOSIS, 93000: NORMAL
Q-T INTERVAL, ECG07: 348 MS
QRS DURATION, ECG06: 108 MS
QTC CALCULATION (BEZET), ECG08: 504 MS
VENTRICULAR RATE, ECG03: 126 BPM

## 2022-10-06 NOTE — DISCHARGE SUMMARY
Physician Discharge Summary     Patient ID:    Carolina Mirza  [id number]  58 y.o.  1955    Admit date: 9/13/2022    Discharge date and time: 10/6/2022    Hospital Diagnoses and Treatment Rendered: This 43-year-old gentleman with a significant past medical history of type 2 diabetes, hypertension, atrial fibrillation with multiple cardioversion, obstructive sleep apnea on CPAP initially presented to [facility] for A. fib with RVR. Patient had episode of presyncope. He was transferred to [facility] for EP evaluation. Patient converted to sinus after arrival here and remained in sinus. Well EP cardiology eval is pending patient expresses eagerness to leave the hospital.  Explained to him the reason for his transfer and the importance of him being seen by his EP cardiologist.  Cardiology was able to come and see him in the evening after end of my shift and patient was cleared for discharge. Discharge order and instructions were entered by my colleague and patient left the hospital around 9 PM.    Primary admitting diagnosis:   Atrial fibrillation with RVR  - Converted to sinus and remained in sinus, seen and cleared by cardiologist for discharge.   Resume home medications      Chronic comorbidities:  Type 2 diabetes  Hypertension  GERD  Anxiety  Gout  Obstructive sleep apnea on CPAP  Anxiety/depression           Chronic Diagnoses:    Problem List as of 9/14/2022 Date Reviewed: 9/12/2022            Codes Class Noted - Resolved    Atrial fibrillation with RVR ([facility] ICD-10-CM: I48.91  ICD-9-CM: 427.31  9/14/2022 - Present        Supraventricular tachycardia ([facility] ICD-10-CM: I47.1  ICD-9-CM: 427.89  9/12/2022 - Present        RESOLVED: UTI (urinary tract infection) ICD-10-CM: N39.0  ICD-9-CM: 599.0  3/15/2022 - 3/17/2022           Discharge Medications:   Discharge Medication List as of 9/14/2022  9:07 PM        CONTINUE these medications which have NOT CHANGED Details   flecainide (TAMBOCOR) 100 mg tablet Take 100 mg by mouth every twelve (12) hours. , Historical Med      metoprolol tartrate (LOPRESSOR) 25 mg tablet Take  by mouth two (2) times a day., Historical Med      sertraline (ZOLOFT) 100 mg tablet Take 100 mg by mouth daily. , Historical Med      pantoprazole (PROTONIX) 40 mg tablet Take 40 mg by mouth Before breakfast and dinner., Historical Med      sucralfate (CARAFATE) 1 gram tablet Take 1 g by mouth Before breakfast, lunch, dinner and at bedtime. , Historical Med      dulaglutide (Trulicity) 7.31 DS/5.4 mL sub-q pen 0.75 mg by SubCUTAneous route every Sunday., Historical Med      metFORMIN ER (GLUCOPHAGE XR) 750 mg tablet Take 750 mg by mouth two (2) times a day., Historical Med      allopurinoL (ZYLOPRIM) 300 mg tablet Take 300 mg by mouth nightly., Historical Med      simvastatin (ZOCOR) 40 mg tablet Take 40 mg by mouth nightly., Historical Med      apixaban (ELIQUIS) 5 mg tablet Take 5 mg by mouth two (2) times a day., Historical Med      empagliflozin (JARDIANCE) 25 mg tablet Take 25 mg by mouth Every morning., Historical Med      traZODone (DESYREL) 50 mg tablet Take 50 mg by mouth two (2) times a day. Take 50 mg by mouth at 5 pm and 8 pm, Historical Med      temazepam (RESTORIL) 15 mg capsule Take 15 mg by mouth nightly., Historical Med      ascorbic acid, vitamin C, (VITAMIN C) 500 mg tablet Take 1,000 mg by mouth daily. , Historical Med      multivitamin (ONE A DAY) tablet Take 1 Tablet by mouth daily. , Historical Med               Follow up Care:     Follow-up Information       Follow up With Specialties Details Why Contact Info    Shaina Taylor MD Family Medicine Follow up in 1 week(s)  [address] [facility]  [address] [facility] [address] [phone]      Umer Ortega MD Cardiovascular Disease Physician, Cardio Vascular Surgery, Clinical Cardiac Electrophysiology Physician Follow up in 1 week(s)  [address] [facility] [address] [facility] [address]  [phone]            1. Melba Ivory MD in 1-2 weeks. Please call to set up an appointment shortly after discharge. Diet:  Regular Diet    Disposition:  Home in stable condition    Advanced Directive:   FULL x   DNR      Discharge Exam:  GENERAL:  Alert, oriented, cooperative, no apparent distress  HEENT:  Normocephalic, atraumatic, non icteric sclerae, non pallor conjuctivae, EOMs intact, PERRLA. NECK: Supple, trachea midline, no adenopathy, no thyromegally or tenderness, no carotid bruit and no JVD. LUNGS:   Vesicular breath sounds bilaterally, no added sounds. HEART:   S1 and S2 well heard,RRR,  no murmur, click, rub or gallop. ABDOMEB:   Soft, non-tender. Normoactive bowel sounds. No masses,  No organomegaly. EXTREMETIES:  Atraumatic, acyanotic, no edema  PULSES: 2+ and symmetric all extremities. SKIN:  No rashes or lesions  NEUROLOGY: Alert and oriented to PPT, CNII-XII intact. Motor and sensory exam grossly intact. CONSULTATIONS: None    Significant Diagnostic Studies:   No results for input(s): WBC, HGB, HCT, PLT, HGBEXT, HCTEXT, PLTEXT in the last 72 hours. No results for input(s): NA, K, CL, CO2, BUN, CREA, GLU, CA, MG, PHOS, URICA in the last 72 hours. No results for input(s): AP, TBIL, TP, ALB, GLOB, GGT, AML, LPSE in the last 72 hours. No lab exists for component: SGOT, GPT, AMYP, HLPSE  No results for input(s): INR, PTP, APTT, INREXT in the last 72 hours. No results for input(s): FE, TIBC, PSAT, FERR in the last 72 hours. No results for input(s): PH, PCO2, PO2 in the last 72 hours. No results for input(s): CPK, CKMB in the last 72 hours. No lab exists for component: TROPONINI  No components found for: [place]      Greater than 30 minutes were spent with the patient on counseling and coordination of care      Signed:   Sheba Brito MD  10/6/2022  7:26 AM

## 2022-12-15 ENCOUNTER — ANESTHESIA (OUTPATIENT)
Dept: CARDIAC CATH/INVASIVE PROCEDURES | Age: 67
DRG: 274 | End: 2022-12-15
Payer: COMMERCIAL

## 2022-12-15 ENCOUNTER — APPOINTMENT (OUTPATIENT)
Dept: CARDIAC CATH/INVASIVE PROCEDURES | Age: 67
DRG: 274 | End: 2022-12-15
Attending: INTERNAL MEDICINE
Payer: COMMERCIAL

## 2022-12-15 ENCOUNTER — HOSPITAL ENCOUNTER (INPATIENT)
Age: 67
LOS: 1 days | Discharge: HOME | DRG: 274 | End: 2022-12-15
Attending: INTERNAL MEDICINE | Admitting: INTERNAL MEDICINE
Payer: COMMERCIAL

## 2022-12-15 ENCOUNTER — ANESTHESIA EVENT (OUTPATIENT)
Dept: CARDIAC CATH/INVASIVE PROCEDURES | Age: 67
DRG: 274 | End: 2022-12-15
Payer: COMMERCIAL

## 2022-12-15 VITALS
BODY MASS INDEX: 33.81 KG/M2 | SYSTOLIC BLOOD PRESSURE: 116 MMHG | WEIGHT: 255.07 LBS | RESPIRATION RATE: 20 BRPM | HEART RATE: 74 BPM | OXYGEN SATURATION: 96 % | HEIGHT: 73 IN | DIASTOLIC BLOOD PRESSURE: 73 MMHG | TEMPERATURE: 97.9 F

## 2022-12-15 DIAGNOSIS — I48.91 ATRIAL FIBRILLATION, UNSPECIFIED TYPE (HCC): ICD-10-CM

## 2022-12-15 PROBLEM — Z95.818 PRESENCE OF WATCHMAN LEFT ATRIAL APPENDAGE CLOSURE DEVICE: Status: ACTIVE | Noted: 2022-12-15

## 2022-12-15 LAB
ABO + RH BLD: NORMAL
ACT BLD: 227 SECS (ref 79–138)
BLOOD GROUP ANTIBODIES SERPL: NORMAL
GLUCOSE BLD STRIP.AUTO-MCNC: 105 MG/DL (ref 65–117)
SERVICE CMNT-IMP: NORMAL
SPECIMEN EXP DATE BLD: NORMAL

## 2022-12-15 PROCEDURE — B24BZZ4 ULTRASONOGRAPHY OF HEART WITH AORTA, TRANSESOPHAGEAL: ICD-10-PCS | Performed by: INTERNAL MEDICINE

## 2022-12-15 PROCEDURE — 93662 INTRACARDIAC ECG (ICE): CPT | Performed by: INTERNAL MEDICINE

## 2022-12-15 PROCEDURE — 77030008684 HC TU ET CUF COVD -B: Performed by: ANESTHESIOLOGY

## 2022-12-15 PROCEDURE — 74011000258 HC RX REV CODE- 258: Performed by: NURSE ANESTHETIST, CERTIFIED REGISTERED

## 2022-12-15 PROCEDURE — C1759 CATH, INTRA ECHOCARDIOGRAPHY: HCPCS | Performed by: INTERNAL MEDICINE

## 2022-12-15 PROCEDURE — 76937 US GUIDE VASCULAR ACCESS: CPT | Performed by: INTERNAL MEDICINE

## 2022-12-15 PROCEDURE — 74011000250 HC RX REV CODE- 250: Performed by: NURSE ANESTHETIST, CERTIFIED REGISTERED

## 2022-12-15 PROCEDURE — 74011250636 HC RX REV CODE- 250/636: Performed by: NURSE ANESTHETIST, CERTIFIED REGISTERED

## 2022-12-15 PROCEDURE — 02L73DK OCCLUSION OF LEFT ATRIAL APPENDAGE WITH INTRALUMINAL DEVICE, PERCUTANEOUS APPROACH: ICD-10-PCS | Performed by: INTERNAL MEDICINE

## 2022-12-15 PROCEDURE — 76060000033 HC ANESTHESIA 1 TO 1.5 HR: Performed by: INTERNAL MEDICINE

## 2022-12-15 PROCEDURE — 82962 GLUCOSE BLOOD TEST: CPT

## 2022-12-15 PROCEDURE — 65270000029 HC RM PRIVATE

## 2022-12-15 PROCEDURE — 33340 PERQ CLSR TCAT L ATR APNDGE: CPT | Performed by: INTERNAL MEDICINE

## 2022-12-15 PROCEDURE — C1894 INTRO/SHEATH, NON-LASER: HCPCS | Performed by: INTERNAL MEDICINE

## 2022-12-15 PROCEDURE — C1889 IMPLANT/INSERT DEVICE, NOC: HCPCS | Performed by: INTERNAL MEDICINE

## 2022-12-15 PROCEDURE — 74011250637 HC RX REV CODE- 250/637: Performed by: INTERNAL MEDICINE

## 2022-12-15 PROCEDURE — 36415 COLL VENOUS BLD VENIPUNCTURE: CPT

## 2022-12-15 PROCEDURE — 93312 ECHO TRANSESOPHAGEAL: CPT

## 2022-12-15 PROCEDURE — 2709999900 HC NON-CHARGEABLE SUPPLY: Performed by: INTERNAL MEDICINE

## 2022-12-15 PROCEDURE — 77030040934 HC CATH DIAG DXTERITY MEDT -A: Performed by: INTERNAL MEDICINE

## 2022-12-15 PROCEDURE — 77030018729 HC ELECTRD DEFIB PAD CARD -B: Performed by: INTERNAL MEDICINE

## 2022-12-15 PROCEDURE — 74011250636 HC RX REV CODE- 250/636: Performed by: ANESTHESIOLOGY

## 2022-12-15 PROCEDURE — 86900 BLOOD TYPING SEROLOGIC ABO: CPT

## 2022-12-15 PROCEDURE — 85347 COAGULATION TIME ACTIVATED: CPT

## 2022-12-15 PROCEDURE — C1760 CLOSURE DEV, VASC: HCPCS | Performed by: INTERNAL MEDICINE

## 2022-12-15 DEVICE — LEFT ATRIAL APPENDAGE CLOSURE DEVICE WITH DELIVERY SYSTEM
Type: IMPLANTABLE DEVICE | Status: FUNCTIONAL
Brand: WATCHMAN FLX™

## 2022-12-15 RX ORDER — RANOLAZINE 500 MG/1
500 TABLET, EXTENDED RELEASE ORAL 2 TIMES DAILY
COMMUNITY

## 2022-12-15 RX ORDER — HYDROMORPHONE HYDROCHLORIDE 1 MG/ML
0.2 INJECTION, SOLUTION INTRAMUSCULAR; INTRAVENOUS; SUBCUTANEOUS
Status: DISCONTINUED | OUTPATIENT
Start: 2022-12-15 | End: 2022-12-15 | Stop reason: HOSPADM

## 2022-12-15 RX ORDER — SODIUM CHLORIDE 9 MG/ML
INJECTION, SOLUTION INTRAVENOUS
Status: DISCONTINUED | OUTPATIENT
Start: 2022-12-15 | End: 2022-12-15 | Stop reason: HOSPADM

## 2022-12-15 RX ORDER — ONDANSETRON 2 MG/ML
INJECTION INTRAMUSCULAR; INTRAVENOUS AS NEEDED
Status: DISCONTINUED | OUTPATIENT
Start: 2022-12-15 | End: 2022-12-15 | Stop reason: HOSPADM

## 2022-12-15 RX ORDER — PROPOFOL 10 MG/ML
INJECTION, EMULSION INTRAVENOUS AS NEEDED
Status: DISCONTINUED | OUTPATIENT
Start: 2022-12-15 | End: 2022-12-15 | Stop reason: HOSPADM

## 2022-12-15 RX ORDER — OXYCODONE AND ACETAMINOPHEN 5; 325 MG/1; MG/1
1 TABLET ORAL AS NEEDED
Status: DISCONTINUED | OUTPATIENT
Start: 2022-12-15 | End: 2022-12-15 | Stop reason: HOSPADM

## 2022-12-15 RX ORDER — FENTANYL CITRATE 50 UG/ML
INJECTION, SOLUTION INTRAMUSCULAR; INTRAVENOUS AS NEEDED
Status: DISCONTINUED | OUTPATIENT
Start: 2022-12-15 | End: 2022-12-15 | Stop reason: HOSPADM

## 2022-12-15 RX ORDER — HEPARIN SODIUM 1000 [USP'U]/ML
INJECTION, SOLUTION INTRAVENOUS; SUBCUTANEOUS AS NEEDED
Status: DISCONTINUED | OUTPATIENT
Start: 2022-12-15 | End: 2022-12-15 | Stop reason: HOSPADM

## 2022-12-15 RX ORDER — SUCCINYLCHOLINE CHLORIDE 20 MG/ML
INJECTION INTRAMUSCULAR; INTRAVENOUS AS NEEDED
Status: DISCONTINUED | OUTPATIENT
Start: 2022-12-15 | End: 2022-12-15 | Stop reason: HOSPADM

## 2022-12-15 RX ORDER — SODIUM CHLORIDE, SODIUM LACTATE, POTASSIUM CHLORIDE, CALCIUM CHLORIDE 600; 310; 30; 20 MG/100ML; MG/100ML; MG/100ML; MG/100ML
75 INJECTION, SOLUTION INTRAVENOUS CONTINUOUS
Status: DISCONTINUED | OUTPATIENT
Start: 2022-12-15 | End: 2022-12-15

## 2022-12-15 RX ORDER — SODIUM CHLORIDE 0.9 % (FLUSH) 0.9 %
5-40 SYRINGE (ML) INJECTION EVERY 8 HOURS
Status: DISCONTINUED | OUTPATIENT
Start: 2022-12-15 | End: 2022-12-15 | Stop reason: HOSPADM

## 2022-12-15 RX ORDER — SODIUM CHLORIDE 9 MG/ML
50 INJECTION, SOLUTION INTRAVENOUS CONTINUOUS
Status: DISCONTINUED | OUTPATIENT
Start: 2022-12-15 | End: 2022-12-15 | Stop reason: HOSPADM

## 2022-12-15 RX ORDER — ACETAMINOPHEN 500 MG
500 TABLET ORAL
COMMUNITY

## 2022-12-15 RX ORDER — SODIUM CHLORIDE 0.9 % (FLUSH) 0.9 %
5-40 SYRINGE (ML) INJECTION AS NEEDED
Status: DISCONTINUED | OUTPATIENT
Start: 2022-12-15 | End: 2022-12-15 | Stop reason: HOSPADM

## 2022-12-15 RX ORDER — DIPHENHYDRAMINE HYDROCHLORIDE 50 MG/ML
12.5 INJECTION, SOLUTION INTRAMUSCULAR; INTRAVENOUS AS NEEDED
Status: DISCONTINUED | OUTPATIENT
Start: 2022-12-15 | End: 2022-12-15 | Stop reason: HOSPADM

## 2022-12-15 RX ORDER — DEXAMETHASONE SODIUM PHOSPHATE 4 MG/ML
INJECTION, SOLUTION INTRA-ARTICULAR; INTRALESIONAL; INTRAMUSCULAR; INTRAVENOUS; SOFT TISSUE AS NEEDED
Status: DISCONTINUED | OUTPATIENT
Start: 2022-12-15 | End: 2022-12-15 | Stop reason: HOSPADM

## 2022-12-15 RX ORDER — ACETAMINOPHEN 325 MG/1
650 TABLET ORAL
Status: DISCONTINUED | OUTPATIENT
Start: 2022-12-15 | End: 2022-12-15 | Stop reason: HOSPADM

## 2022-12-15 RX ORDER — PROTAMINE SULFATE 10 MG/ML
INJECTION, SOLUTION INTRAVENOUS AS NEEDED
Status: DISCONTINUED | OUTPATIENT
Start: 2022-12-15 | End: 2022-12-15 | Stop reason: HOSPADM

## 2022-12-15 RX ORDER — MIDAZOLAM HYDROCHLORIDE 1 MG/ML
0.5 INJECTION, SOLUTION INTRAMUSCULAR; INTRAVENOUS
Status: DISCONTINUED | OUTPATIENT
Start: 2022-12-15 | End: 2022-12-15 | Stop reason: HOSPADM

## 2022-12-15 RX ORDER — FENTANYL CITRATE 50 UG/ML
50 INJECTION, SOLUTION INTRAMUSCULAR; INTRAVENOUS AS NEEDED
Status: DISCONTINUED | OUTPATIENT
Start: 2022-12-15 | End: 2022-12-15 | Stop reason: HOSPADM

## 2022-12-15 RX ORDER — CEFAZOLIN SODIUM 1 G/3ML
INJECTION, POWDER, FOR SOLUTION INTRAMUSCULAR; INTRAVENOUS AS NEEDED
Status: DISCONTINUED | OUTPATIENT
Start: 2022-12-15 | End: 2022-12-15 | Stop reason: HOSPADM

## 2022-12-15 RX ORDER — FENTANYL CITRATE 50 UG/ML
25 INJECTION, SOLUTION INTRAMUSCULAR; INTRAVENOUS
Status: DISCONTINUED | OUTPATIENT
Start: 2022-12-15 | End: 2022-12-15 | Stop reason: HOSPADM

## 2022-12-15 RX ORDER — MIDAZOLAM HYDROCHLORIDE 1 MG/ML
1 INJECTION, SOLUTION INTRAMUSCULAR; INTRAVENOUS AS NEEDED
Status: DISCONTINUED | OUTPATIENT
Start: 2022-12-15 | End: 2022-12-15 | Stop reason: HOSPADM

## 2022-12-15 RX ORDER — ONDANSETRON 2 MG/ML
4 INJECTION INTRAMUSCULAR; INTRAVENOUS AS NEEDED
Status: DISCONTINUED | OUTPATIENT
Start: 2022-12-15 | End: 2022-12-15 | Stop reason: HOSPADM

## 2022-12-15 RX ORDER — ROCURONIUM BROMIDE 10 MG/ML
INJECTION, SOLUTION INTRAVENOUS AS NEEDED
Status: DISCONTINUED | OUTPATIENT
Start: 2022-12-15 | End: 2022-12-15 | Stop reason: HOSPADM

## 2022-12-15 RX ORDER — MORPHINE SULFATE 10 MG/ML
2 INJECTION, SOLUTION INTRAMUSCULAR; INTRAVENOUS
Status: DISCONTINUED | OUTPATIENT
Start: 2022-12-15 | End: 2022-12-15 | Stop reason: HOSPADM

## 2022-12-15 RX ORDER — DEXMEDETOMIDINE HYDROCHLORIDE 100 UG/ML
INJECTION, SOLUTION INTRAVENOUS AS NEEDED
Status: DISCONTINUED | OUTPATIENT
Start: 2022-12-15 | End: 2022-12-15 | Stop reason: HOSPADM

## 2022-12-15 RX ORDER — LIDOCAINE HYDROCHLORIDE 10 MG/ML
0.1 INJECTION, SOLUTION EPIDURAL; INFILTRATION; INTRACAUDAL; PERINEURAL AS NEEDED
Status: DISCONTINUED | OUTPATIENT
Start: 2022-12-15 | End: 2022-12-15 | Stop reason: HOSPADM

## 2022-12-15 RX ORDER — GLYCOPYRROLATE 0.2 MG/ML
INJECTION INTRAMUSCULAR; INTRAVENOUS AS NEEDED
Status: DISCONTINUED | OUTPATIENT
Start: 2022-12-15 | End: 2022-12-15 | Stop reason: HOSPADM

## 2022-12-15 RX ORDER — NEOSTIGMINE METHYLSULFATE 1 MG/ML
INJECTION, SOLUTION INTRAVENOUS AS NEEDED
Status: DISCONTINUED | OUTPATIENT
Start: 2022-12-15 | End: 2022-12-15 | Stop reason: HOSPADM

## 2022-12-15 RX ADMIN — NEOSTIGMINE METHYLSULFATE 3 MG: 1 INJECTION, SOLUTION INTRAVENOUS at 09:24

## 2022-12-15 RX ADMIN — FENTANYL CITRATE 50 MCG: 50 INJECTION, SOLUTION INTRAMUSCULAR; INTRAVENOUS at 09:17

## 2022-12-15 RX ADMIN — PROPOFOL 200 MG: 10 INJECTION, EMULSION INTRAVENOUS at 08:26

## 2022-12-15 RX ADMIN — DEXMEDETOMIDINE HYDROCHLORIDE 10 MCG: 100 INJECTION, SOLUTION, CONCENTRATE INTRAVENOUS at 09:17

## 2022-12-15 RX ADMIN — SUCCINYLCHOLINE CHLORIDE 200 MG: 20 INJECTION, SOLUTION INTRAMUSCULAR; INTRAVENOUS at 08:31

## 2022-12-15 RX ADMIN — CEFAZOLIN 2 G: 330 INJECTION, POWDER, FOR SOLUTION INTRAMUSCULAR; INTRAVENOUS at 08:42

## 2022-12-15 RX ADMIN — PHENYLEPHRINE HYDROCHLORIDE 80 MCG/MIN: 10 INJECTION INTRAVENOUS at 08:47

## 2022-12-15 RX ADMIN — SODIUM CHLORIDE 50 ML/HR: 9 INJECTION, SOLUTION INTRAVENOUS at 07:40

## 2022-12-15 RX ADMIN — ACETAMINOPHEN 650 MG: 325 TABLET ORAL at 12:16

## 2022-12-15 RX ADMIN — GLYCOPYRROLATE 0.4 MG: 0.2 INJECTION INTRAMUSCULAR; INTRAVENOUS at 09:24

## 2022-12-15 RX ADMIN — ROCURONIUM BROMIDE 5 MG: 10 SOLUTION INTRAVENOUS at 08:26

## 2022-12-15 RX ADMIN — FENTANYL CITRATE 50 MCG: 50 INJECTION, SOLUTION INTRAMUSCULAR; INTRAVENOUS at 08:26

## 2022-12-15 RX ADMIN — ROCURONIUM BROMIDE 35 MG: 10 SOLUTION INTRAVENOUS at 08:31

## 2022-12-15 RX ADMIN — PHENYLEPHRINE HYDROCHLORIDE 80 MCG/MIN: 10 INJECTION INTRAVENOUS at 08:42

## 2022-12-15 RX ADMIN — SODIUM CHLORIDE: 900 INJECTION, SOLUTION INTRAVENOUS at 07:37

## 2022-12-15 RX ADMIN — PROTAMINE SULFATE 50 MG: 10 INJECTION, SOLUTION INTRAVENOUS at 09:23

## 2022-12-15 RX ADMIN — SODIUM CHLORIDE: 900 INJECTION, SOLUTION INTRAVENOUS at 08:55

## 2022-12-15 RX ADMIN — HEPARIN SODIUM 10000 UNITS: 1000 INJECTION, SOLUTION INTRAVENOUS; SUBCUTANEOUS at 08:50

## 2022-12-15 RX ADMIN — DEXAMETHASONE SODIUM PHOSPHATE 4 MG: 4 INJECTION, SOLUTION INTRAMUSCULAR; INTRAVENOUS at 08:50

## 2022-12-15 RX ADMIN — ROCURONIUM BROMIDE 10 MG: 10 SOLUTION INTRAVENOUS at 09:17

## 2022-12-15 RX ADMIN — ONDANSETRON HYDROCHLORIDE 4 MG: 2 INJECTION, SOLUTION INTRAMUSCULAR; INTRAVENOUS at 09:23

## 2022-12-15 NOTE — PROGRESS NOTES
Pt ambulated 100 ft, gait steady, voided, back to stretcher right groin dsg D&I, assisted with dressing, reviewed d/c instructions, teach back method used.     1330  discharged via w/c with wife, instructions,and belongings

## 2022-12-15 NOTE — ANESTHESIA PREPROCEDURE EVALUATION
Relevant Problems   CARDIOVASCULAR   (+) Atrial fibrillation with RVR (HCC)   (+) Supraventricular tachycardia (HCC)       Anesthetic History               Review of Systems / Medical History  Patient summary reviewed, nursing notes reviewed and pertinent labs reviewed    Pulmonary        Sleep apnea: CPAP           Neuro/Psych         Psychiatric history    Comments:  Insomnia (G47.00)    Depression (F32.A)     Cardiovascular    Hypertension        Dysrhythmias : atrial fibrillation, atrial flutter and SVT      Exercise tolerance: >4 METS     GI/Hepatic/Renal  Within defined limits              Endo/Other    Diabetes         Other Findings            Physical Exam    Airway  Mallampati: III  TM Distance: > 6 cm  Neck ROM: normal range of motion   Mouth opening: Normal     Cardiovascular    Rhythm: irregular  Rate: normal         Dental    Dentition: Caps/crowns     Pulmonary  Breath sounds clear to auscultation               Abdominal  GI exam deferred       Other Findings            Anesthetic Plan    ASA: 3  Anesthesia type: general    Monitoring Plan: DAVDI      Induction: Intravenous  Anesthetic plan and risks discussed with: Patient

## 2022-12-15 NOTE — ANESTHESIA PROCEDURE NOTES
DAVID        Procedure Details: probe placement, image aquisition & interpretation    Risks and benefits discussed with the patient and plans are to proceed    Procedure Note    Performed by: Nae Castro DO  Authorized by: Nae Castro DO     Indications: assessment of surgical repair  Modalities: 2D, CF, PWD  Probe Type: multiplane  Insertion: atraumatic  Patient Status: intubated and sedated  Echocardiographic and Doppler Measurements   Aorta  Size  Diam(cm)  Dissection PlaqueThick(mm)  Plaque Mobile    Ascending normal  No 0-3     Arch normal  No 0-3     Descending normal  No 0-3           Valves  Annulus  Stenosis  Area/Grad  Regurg  Leaflet   Morph  Leaflet   Motion    Aortic normal none  0 normal normal    Mitral normal   1+ normal normal    Tricuspid normal   1+ normal normal          Atria  Size  SEC (smoke)  Thrombus  Tumor  Device    Rt Atrium normal No No No No    Lt Atrium normal No No No No     Interatrial Septum Morphology: normal    Interventricular Septum Morphology: normal  Ventricle  Cavity Size  Cavity Dimension Hypertrophy  Thrombus  Gloal FXN  EF    RV normal  No no normal     LV normal   No normal        Regional Function  (1 = normal, 2 = mildly hypokinetic, 3 = severely hypokinetic, 4 = akinetic, 5 = dyskinetic) LAV - [place]   ME LAV = 0  ME LAV = 90  ME LAV = 130   Basal Sept:1 Basal Ant:1 Basal Post:1   Mid Sept:1 Mid Ant:1 Mid Post:1   Apical Sept:1 Apical Ant:1 Basal Ant Sept:1   Basal Lat:1 Basal Inf:1 Mid Ant Sept:1   Mid Lat:1 Mid Inf:1    Apical Lat:1 Apical Inf:1      Diastolic function: Mild impairment  Pericardium: normal    Post Intervention Follow-up Study  Ventricular Global Function: unchanged  Ventricular Regional Function: unchanged     Valve  Function  Regurgitation  Area    Aortic no change      Mitral no change      Tricuspid no change      Prosthetic          Comments: Pre procedure: Multiple images of ERIKA including but not limited to 0, 45,90 and 135 degrees were obtained and reviewed with team.  No PFO. Mild righted sided dilation. Postprocedure: Watchman placed without event, again multiple measurements made at above angles and beyond and device met standards, no significant CFM seen around device. Left to right residual shunt across septal puncture, effusion unchanged. All reviewed and discussed with cardiologist.      DAVID placed and removed without event.

## 2022-12-15 NOTE — PROGRESS NOTES
TRANSFER - IN REPORT:    Verbal report received from jose Brown and CRNA on Emma Gaines  being received from procedure for routine progression of care. Report consisted of patients Situation, Background, Assessment and Recommendations(SBAR). Information from the following report(s) Procedure Summary, Intake/Output, MAR, Accordion, Recent Results, and Med Rec Status was reviewed with the receiving clinician. Opportunity for questions and clarification was provided. Assessment completed upon patients arrival to [address] [place] [address] and care assumed. Cardiac Cath Lab Recovery Arrival Note:    Emma Gaines arrived to [facility] recovery area. Patient procedure= watchman. Patient on cardiac monitor, non-invasive blood pressure, SPO2 monitor. On O2 @ 2 lpm via n/c. IV  of nacl on pump at 25 ml/hr. Patient status doing well without problems. Patient is A&Ox 4. Patient reports no complaints. PROCEDURE SITE CHECK:    Procedure site:without any bleeding and or hematoma, no pain/discomfort reported at procedure site. No change in patient status. Continue to monitor patient and status.

## 2022-12-15 NOTE — PROGRESS NOTES
Cardiac Cath Lab Recovery Arrival Note:      Laura Patel arrived to Cardiac Cath Lab, Recovery Area. Staff introduced to patient. Patient identifiers verified with NAME and DATE OF BIRTH. Procedure verified with patient. Consent forms reviewed and signed by patient or authorized representative and verified. Allergies verified. Patient and family oriented to department. Patient and family informed of procedure and plan of care. Questions answered with review. Patient prepped for procedure, per orders from physician, prior to arrival.    Patient on cardiac monitor, non-invasive blood pressure, SPO2 monitor. On room air. Patient is A&Ox 4. Patient reports no complaints. Patient in stretcher, in low position, with side rails up, call bell within reach, patient instructed to call if assistance as needed. Patient prep in: [address], [place] [address]. Patient family has pager # [phone]  Family in: [facility]   [phone]. Prep by: Francisca Escobar RN  Pre watchman teaching completed    075 Dr Tiny Frey in to talk with pt  Anesthesia in to talk with pt.

## 2022-12-15 NOTE — ROUTINE PROCESS
Cardiac Cath Lab Procedure Area Arrival Note:    Nayan Gutierrez arrived to Cardiac Cath Lab, Procedure Area. Patient identifiers verified with NAME and DATE OF BIRTH. Procedure verified with patient. Consent forms verified. Allergies verified. Patient informed of procedure and plan of care. Questions answered with review. Patient voiced understanding of procedure and plan of care. Patient on cardiac monitor, non-invasive blood pressure, SPO2 monitor. On stretcher under care of CRNA. Patient status doing well without problems. Patient is A&Ox 4. Patient reports no c/o. Patient medicated during procedure with orders obtained and verified by Dr. Theo Rodriguez. Refer to patients Cardiac Cath Lab PROCEDURE REPORT for vital signs, assessment, status, and response during procedure, printed at end of case. Printed report on chart or scanned into chart.

## 2022-12-16 NOTE — ANESTHESIA POSTPROCEDURE EVALUATION
Procedure(s):  WATCHMAN ERIKA CLOSURE DEVICE  INTRACARDIAC ECHOCARDIOGRAM  ULTRASOUND GUIDED VASCULAR ACCESS. general    Anesthesia Post Evaluation      Multimodal analgesia: multimodal analgesia used between 6 hours prior to anesthesia start to PACU discharge  Patient location during evaluation: PACU  Patient participation: complete - patient participated  Level of consciousness: awake  Pain management: adequate  Airway patency: patent  Anesthetic complications: no  Cardiovascular status: acceptable  Respiratory status: acceptable  Hydration status: acceptable  Post anesthesia nausea and vomiting:  none  Final Post Anesthesia Temperature Assessment:  Normothermia (36.0-37.5 degrees C)      INITIAL Post-op Vital signs:   Vitals Value Taken Time   /73 12/15/22 1320   Temp 36.6 °C (97.9 °F) 12/15/22 1010   Pulse 69 12/15/22 1323   Resp 20 12/15/22 1320   SpO2 96 % 12/15/22 1320   Vitals shown include unvalidated device data.

## 2022-12-16 NOTE — DISCHARGE SUMMARY
Discharge Summary     Patient: Christina Gupta MRN: [id number]  SSN: [id number]    YOB: 1955  Age: 79 y.o. Sex: male       Admit Date: 12/15/2022    Discharge Date: 12/16/2022      Admission Diagnoses: Atrial fibrillation, unspecified type ([facility] [I48.91]  Presence of Watchman left atrial appendage closure device [Z95.818]    Discharge Diagnoses:   Problem List as of 12/15/2022 Date Reviewed: 9/12/2022            Codes Class Noted - Resolved    Atrial fibrillation with RVR ([facility] ICD-10-CM: I48.91  ICD-9-CM: 427.31  9/14/2022 - Present        Supraventricular tachycardia ([facility] ICD-10-CM: I47.1  ICD-9-CM: 427.89  9/12/2022 - Present        RESOLVED: UTI (urinary tract infection) ICD-10-CM: N39.0  ICD-9-CM: 599.0  3/15/2022 - 3/17/2022        Presence of Watchman left atrial appendage closure device ICD-10-CM: Z95.818  ICD-9-CM: V45.09  12/15/2022 - Present            Discharge Condition: Good    Hospital Course: Patient admitted and underwent successful watchman implant. NO immediate complications post procedure and was discharged home. Consults: None    Significant Diagnostic Studies: n/a    Disposition: home    Discharge Medications:   Cannot display discharge medications since this patient is not currently admitted. Activity: No lifting, Driving, or Strenuous exercise for 1 week. Diet: Regular Diet  Wound Care: None needed    Follow-up Appointments   Procedures    FOLLOW UP VISIT Appointment in: Two Weeks     Standing Status:   Standing     Number of Occurrences:   1     Order Specific Question:   Appointment in     Answer: Two Weeks       Signed By: Vi Dominguez MD     December 16, 2022      .

## (undated) DEVICE — SHEET,DRAPE,UNDERBUTTOCK,GRAD POUCH,PORT: Brand: MEDLINE

## (undated) DEVICE — SYRINGE IRRIG 60ML SFT PLIABLE BLB EZ TO GRP 1 HND USE W/

## (undated) DEVICE — INTRO SHTH SUP SHTH 9FRX25CM --

## (undated) DEVICE — GOWN,PLEAT,SPECIALTY,XL,STRL: Brand: MEDLINE

## (undated) DEVICE — PINNACLE INTRODUCER SHEATH: Brand: PINNACLE

## (undated) DEVICE — CANISTER SUCT C13L L VOL W/OUT TBNG DISP FOR USE W/

## (undated) DEVICE — MEDI-TRACE CADENCE ADULT, DEFIBRILLATION ELECTRODE -RTS  (10 PR/PK) - PHYSIO-CONTROL: Brand: MEDI-TRACE CADENCE

## (undated) DEVICE — SOLUTION IRRIG 1000ML STRL H2O USP PLAS POUR BTL

## (undated) DEVICE — SYR LR LCK 1ML GRAD NSAF 30ML --

## (undated) DEVICE — 1 X VERSACROSS STEERABLE SHEATH (INCLUDING  1 X DILATOR AND 1 X J-TIP GUIDEWIRE); 1 X VERSACROSS RF WIRE (INCLUDING 1 X CONNECTOR CABLE (SINGLE USE)); 1 X DISPERSIVE ELECTRODE: Brand: VERSACROSS STEERABLE ACCESS SOLUTION

## (undated) DEVICE — REM POLYHESIVE ADULT PATIENT RETURN ELECTRODE: Brand: VALLEYLAB

## (undated) DEVICE — SOLUTION IRRIG 3000ML 0.9% SOD CHL USP UROMATIC PLAS CONT

## (undated) DEVICE — ACCESS SHEATH WITH DILATOR: Brand: WATCHMAN FXD CURVE™ ACCESS SYSTEM

## (undated) DEVICE — 1 X VERSACROSS TRANSSEPTAL SHEATH (INCLUDING  1 X J-TIP GUIDEWIRE); 1 X VERSACROSS RF WIRE (INCLUDING 1 X CONNECTOR CABLE (SINGLE USE)); 1 X DISPERSIVE ELECTRODE: Brand: VERSACROSS ACCESS SOLUTION

## (undated) DEVICE — DRAINBAG,ANTI-REFLUX TOWER,L/F,2000ML,LL: Brand: MEDLINE

## (undated) DEVICE — CATHETER REPROC ULTRASOUND 8 FRX90 CM ACUSON ACUNAV

## (undated) DEVICE — CATHETER DIAG 5FR L110CM PIG CRV SZ 6 SIDE H DBL BRAID WIRE

## (undated) DEVICE — MTS LEFT HEART KIT ST MARY'S RICHMOND: Brand: NAMIC

## (undated) DEVICE — BASIC SINGLE BASIN BTC-LF: Brand: MEDLINE INDUSTRIES, INC.

## (undated) DEVICE — PROVE COVER: Brand: UNBRANDED

## (undated) DEVICE — GLOVE SURG SZ 8 L12IN FNGR THK94MIL STD WHT LTX FREE

## (undated) DEVICE — COVER CATH ACUNAV ULTRASOUND 5X72IN ANTI STATIC

## (undated) DEVICE — 3M™ RANGER™ FLUID WARMING IRRIGATION SET, 24750, 10/CASE: Brand: 3M™ RANGER™

## (undated) DEVICE — HEART CATH-MRMC: Brand: MEDLINE INDUSTRIES, INC.

## (undated) DEVICE — CYSTO-SMH: Brand: MEDLINE INDUSTRIES, INC.

## (undated) DEVICE — PERCLOSE PROGLIDE™ SUTURE-MEDIATED CLOSURE SYSTEM: Brand: PERCLOSE PROGLIDE™

## (undated) DEVICE — ELECTRODE,RADIOTRANSLUCENT,FOAM,5PK: Brand: MEDLINE

## (undated) DEVICE — WASTE KIT - ST MARY: Brand: MEDLINE INDUSTRIES, INC.